# Patient Record
Sex: MALE | Race: WHITE | NOT HISPANIC OR LATINO | Employment: UNEMPLOYED | URBAN - METROPOLITAN AREA
[De-identification: names, ages, dates, MRNs, and addresses within clinical notes are randomized per-mention and may not be internally consistent; named-entity substitution may affect disease eponyms.]

---

## 2019-10-15 ENCOUNTER — OFFICE VISIT (OUTPATIENT)
Dept: FAMILY MEDICINE CLINIC | Facility: CLINIC | Age: 39
End: 2019-10-15

## 2019-10-15 VITALS
WEIGHT: 180.4 LBS | OXYGEN SATURATION: 98 % | TEMPERATURE: 98.4 F | SYSTOLIC BLOOD PRESSURE: 111 MMHG | HEIGHT: 71 IN | DIASTOLIC BLOOD PRESSURE: 62 MMHG | BODY MASS INDEX: 25.26 KG/M2 | HEART RATE: 64 BPM

## 2019-10-15 DIAGNOSIS — E78.5 HYPERLIPIDEMIA, UNSPECIFIED HYPERLIPIDEMIA TYPE: ICD-10-CM

## 2019-10-15 DIAGNOSIS — G89.4 PAIN SYNDROME, CHRONIC: ICD-10-CM

## 2019-10-15 DIAGNOSIS — Z23 NEED FOR IMMUNIZATION AGAINST INFLUENZA: ICD-10-CM

## 2019-10-15 DIAGNOSIS — K21.9 GASTROESOPHAGEAL REFLUX DISEASE WITHOUT ESOPHAGITIS: ICD-10-CM

## 2019-10-15 DIAGNOSIS — E03.9 HYPOTHYROIDISM, UNSPECIFIED TYPE: Primary | ICD-10-CM

## 2019-10-15 DIAGNOSIS — M54.10 RADICULOPATHY AFFECTING UPPER EXTREMITY: ICD-10-CM

## 2019-10-15 PROCEDURE — 90471 IMMUNIZATION ADMIN: CPT | Performed by: INTERNAL MEDICINE

## 2019-10-15 PROCEDURE — 99214 OFFICE O/P EST MOD 30 MIN: CPT | Performed by: INTERNAL MEDICINE

## 2019-10-15 PROCEDURE — 90686 IIV4 VACC NO PRSV 0.5 ML IM: CPT | Performed by: INTERNAL MEDICINE

## 2019-10-15 RX ORDER — TRIAMCINOLONE ACETONIDE 55 UG/1
2 SPRAY, METERED NASAL DAILY
COMMUNITY

## 2019-10-15 RX ORDER — PRAVASTATIN SODIUM 40 MG
40 TABLET ORAL
Refills: 5 | COMMUNITY
Start: 2019-09-21 | End: 2019-10-28

## 2019-10-15 RX ORDER — TRAMADOL HYDROCHLORIDE 50 MG/1
50 TABLET ORAL EVERY 6 HOURS PRN
Qty: 30 TABLET | Refills: 0 | Status: SHIPPED | OUTPATIENT
Start: 2019-10-15 | End: 2020-04-08 | Stop reason: SDUPTHER

## 2019-10-15 RX ORDER — MOMETASONE FUROATE 1 MG/G
CREAM TOPICAL DAILY
COMMUNITY

## 2019-10-15 RX ORDER — LEVOTHYROXINE SODIUM 137 UG/1
137 TABLET ORAL DAILY
Refills: 6 | COMMUNITY
Start: 2019-09-21 | End: 2019-10-28

## 2019-10-15 RX ORDER — OMEPRAZOLE 40 MG/1
20 CAPSULE, DELAYED RELEASE ORAL DAILY
COMMUNITY

## 2019-10-15 NOTE — PROGRESS NOTES
"Francis Nayak is a 39 y.o. male.     Chief Complaint   Patient presents with   • GI Problem   • Pain   • Back Pain   • other       History of Present Illness   Follow-up for thyroid.  Denies fatigue, weakness, constipation/diarrhea, hair/skin changes, weight gain/loss, depression/anxiety, rashes, palpitations, swelling, chest pain, shortness of breath or other issues.  Has been compliant with taking the medication with no recent changes.  Denies any difficulty with the current medication.  Is due for labs.  Follow-up for cholesterol.  Currently has been feeling well without any myalgias, muscle aches, weakness, numbness, chest pain, short of breath or other issues.  Currently is adherent with medication regimen and denies medication side effects. Is due for lab follow-up.    The last few weeks with nausea but has improved with different diet and consistent prilosec usage.  States the right arm and shoulder with neck discomfort and tired feeling for the last \"few months\"  Not really painful for fatigued.  Worsens as the day progresses.  Strength and sensation/feeling are \"fine\".  Has a \"vibrating feeling sometimes on the inside of the right biceps\"  Mainly at night when goes to bed.  Always in the same spot.  No pain.  Has been having some increased cramps in the feet and calves.  Has constant low back pain.  Continues with the chronic burning pain in the left side and chest worse in the mornings.  Uses the tramadol as needed which he does once a week but it causes him not to be hungry.      The following portions of the patient's history were reviewed and updated as appropriate: allergies, current medications, past family history, past medical history, past social history, past surgical history and problem list.    Past Medical History:   Diagnosis Date   • Alopecia    • BMI 25.0-25.9,adult    • Elevated ferritin    • Encounter for immunization    • Esophageal reflux    • History of long-term treatment " with high-risk medication    • Hives    • Hyperlipidemia    • Hypothyroidism    • Left-sided chest wall pain    • Pain syndrome, chronic    • Radiation retinopathy    • Rosacea    • Scoliosis    • Seasonal allergies    • Sensorineural hearing loss (SNHL), bilateral    • Vision loss, left eye        Past Surgical History:   Procedure Laterality Date   • CATARACT EXTRACTION  2004   • EYE SURGERY  1994    results Strabismus   • SINUS SURGERY  1990   • TOTAL THYROIDECTOMY  10/02/2013    benign folicular neoplasms       Family History   Problem Relation Age of Onset   • Lymphoma Father    • Lymphoma Maternal Grandmother    • Heart disease Maternal Grandmother         acute MI   • Coronary artery disease Maternal Grandmother        Social History     Socioeconomic History   • Marital status: Single     Spouse name: Not on file   • Number of children: Not on file   • Years of education: Not on file   • Highest education level: Not on file   Tobacco Use   • Smoking status: Never Smoker   • Smokeless tobacco: Never Used   Substance and Sexual Activity   • Alcohol use: Yes     Frequency: 2-3 times a week     Drinks per session: 5 or 6     Binge frequency: Monthly   • Drug use: No   • Sexual activity: Defer       Review of Systems   Constitutional: Negative for activity change, appetite change, fatigue, fever, unexpected weight gain and unexpected weight loss.   HENT: Negative for nosebleeds, rhinorrhea, trouble swallowing and voice change.    Eyes: Negative for visual disturbance.   Respiratory: Negative for cough, chest tightness, shortness of breath and wheezing.    Cardiovascular: Negative for chest pain, palpitations and leg swelling.   Gastrointestinal: Negative for abdominal pain, blood in stool, constipation, diarrhea, nausea, vomiting, GERD and indigestion.   Genitourinary: Negative for dysuria, frequency and hematuria.   Musculoskeletal: Negative for arthralgias, back pain and myalgias.   Skin: Negative for rash and  bruise.   Neurological: Negative for dizziness, tremors, weakness, light-headedness, numbness, headache and memory problem.   Hematological: Negative for adenopathy. Does not bruise/bleed easily.   Psychiatric/Behavioral: Negative for sleep disturbance and depressed mood. The patient is not nervous/anxious.        Objective   Vitals:    10/15/19 1121   BP: 111/62   Pulse: 64   Temp: 98.4 °F (36.9 °C)   SpO2: 98%     Body mass index is 25.16 kg/m².  Physical Exam   Constitutional: He is oriented to person, place, and time. He appears well-developed and well-nourished. No distress.   HENT:   Head: Normocephalic and atraumatic.   Right Ear: External ear normal.   Left Ear: External ear normal.   Nose: Nose normal.   Mouth/Throat: Oropharynx is clear and moist.   Eyes: Conjunctivae and EOM are normal. Pupils are equal, round, and reactive to light.   Neck: Normal range of motion. Neck supple. No tracheal deviation present. No thyromegaly present.   Cardiovascular: Normal rate, regular rhythm, normal heart sounds and intact distal pulses. Exam reveals no gallop and no friction rub.   No murmur heard.  Pulmonary/Chest: Effort normal and breath sounds normal. No respiratory distress.   Abdominal: Soft. Bowel sounds are normal. He exhibits no mass. There is no tenderness. There is no guarding.   Musculoskeletal: Normal range of motion. He exhibits no edema.   Lymphadenopathy:     He has no cervical adenopathy.   Neurological: He is alert and oriented to person, place, and time. He displays normal reflexes. He exhibits normal muscle tone.   Skin: Skin is warm and dry. Capillary refill takes less than 2 seconds. No rash noted. He is not diaphoretic.   Psychiatric: He has a normal mood and affect. His behavior is normal. Judgment and thought content normal.   Nursing note and vitals reviewed.      No results found for this or any previous visit (from the past 2016 hour(s)).  Assessment/Plan   Sami was seen today for gi  problem, pain, back pain and other.    Diagnoses and all orders for this visit:    Hypothyroidism, unspecified type  -     TSH  -     T4, Free  -     Vitamin B12 & Folate    Hyperlipidemia, unspecified hyperlipidemia type  -     Comprehensive Metabolic Panel  -     Lipid Panel    Gastroesophageal reflux disease without esophagitis    Radiculopathy affecting upper extremity  -     traMADol (ULTRAM) 50 MG tablet; Take 1 tablet by mouth Every 6 (Six) Hours As Needed for Moderate Pain .    Pain syndrome, chronic  -     traMADol (ULTRAM) 50 MG tablet; Take 1 tablet by mouth Every 6 (Six) Hours As Needed for Moderate Pain .    Need for immunization against influenza  -     Fluarix/Fluzone/Afluria Quad>6 Months; Standing  -     Fluarix/Fluzone/Afluria Quad>6 Months      Will check the labs as ordered.  Discussed and reviewed the patient's multiple vague symptoms.  We will check the labs as noted above.  Avoid alcohol tobacco if all normal and still issues with the arm then consider MRI of the neck for evaluation.  Encouraged diet modification for the stomach with the GERD issues.  And acidic foods including tomato sauces and citrus drinks.  Continue the omeprazole for the stomach.  WIN run and reviewed.  Risks of the medication include but are not limited to fatigue, somnolence, increased risk of falls, constipation, allergic reaction, dependence, and addiction

## 2019-10-16 DIAGNOSIS — E03.9 HYPOTHYROIDISM, UNSPECIFIED TYPE: Primary | ICD-10-CM

## 2019-10-16 LAB
ALBUMIN SERPL-MCNC: 5.2 G/DL (ref 3.5–5.2)
ALBUMIN/GLOB SERPL: 1.9 G/DL
ALP SERPL-CCNC: 72 U/L (ref 39–117)
ALT SERPL-CCNC: 21 U/L (ref 1–41)
AST SERPL-CCNC: 18 U/L (ref 1–40)
BILIRUB SERPL-MCNC: 0.5 MG/DL (ref 0.2–1.2)
BUN SERPL-MCNC: 11 MG/DL (ref 6–20)
BUN/CREAT SERPL: 9.7 (ref 7–25)
CALCIUM SERPL-MCNC: 9.8 MG/DL (ref 8.6–10.5)
CHLORIDE SERPL-SCNC: 99 MMOL/L (ref 98–107)
CHOLEST SERPL-MCNC: 189 MG/DL (ref 0–200)
CO2 SERPL-SCNC: 35.6 MMOL/L (ref 22–29)
CREAT SERPL-MCNC: 1.13 MG/DL (ref 0.76–1.27)
FOLATE SERPL-MCNC: 4.81 NG/ML (ref 4.78–24.2)
GLOBULIN SER CALC-MCNC: 2.8 GM/DL
GLUCOSE SERPL-MCNC: 81 MG/DL (ref 65–99)
HDLC SERPL-MCNC: 49 MG/DL (ref 40–60)
LDLC SERPL CALC-MCNC: 114 MG/DL (ref 0–100)
POTASSIUM SERPL-SCNC: 4.8 MMOL/L (ref 3.5–5.2)
PROT SERPL-MCNC: 8 G/DL (ref 6–8.5)
SODIUM SERPL-SCNC: 142 MMOL/L (ref 136–145)
T4 FREE SERPL-MCNC: 1.33 NG/DL (ref 0.93–1.7)
TRIGL SERPL-MCNC: 132 MG/DL (ref 0–150)
TSH SERPL DL<=0.005 MIU/L-ACNC: 23.6 UIU/ML (ref 0.27–4.2)
VIT B12 SERPL-MCNC: 333 PG/ML (ref 211–946)
VLDLC SERPL CALC-MCNC: 26.4 MG/DL (ref 5–40)

## 2019-10-17 ENCOUNTER — TELEPHONE (OUTPATIENT)
Dept: FAMILY MEDICINE CLINIC | Facility: CLINIC | Age: 39
End: 2019-10-17

## 2019-10-17 DIAGNOSIS — R79.89 ELEVATED TSH: Primary | ICD-10-CM

## 2019-10-17 DIAGNOSIS — Z85.841 HISTORY OF MEDULLOBLASTOMA: ICD-10-CM

## 2019-10-17 NOTE — TELEPHONE ENCOUNTER
The MRI has been ordered of the head we are just waiting for that to be scheduled.  Depending upon the result will determine what needs to be done and it what timeframe.  No new medicines recommended at this time.

## 2019-10-17 NOTE — TELEPHONE ENCOUNTER
Pt mtr called that you return her call. She is concerned because endocrinology was not able to get pt in until 12/10/19. They want to know if it is okay to wait that long, what can be done about the pain and also can you go ahead and order an MRI?

## 2019-10-21 ENCOUNTER — TELEPHONE (OUTPATIENT)
Dept: ENDOCRINOLOGY | Age: 39
End: 2019-10-21

## 2019-10-21 NOTE — TELEPHONE ENCOUNTER
There is a new patient slot on Friday. Yes it will give him 15 but it sounds like the patient needs to be seen so use it if they can come in.

## 2019-10-21 NOTE — TELEPHONE ENCOUNTER
I called mother but the new patient  appointment on 10-25-19 has been used for another patient. I told her I would let you know and that she can keep calling our office to see if Dr. Roy has a cancellation. If you have an earlier date and time than scheduled,  will you let me know. Thank you.

## 2019-10-21 NOTE — TELEPHONE ENCOUNTER
Mother Marion called regards to patient being on wait list for sooner appointment with Dr. Roy on 12-10-19., 9:00am.  She said patient had brain and spine cancer when he was 14 and 6 years ago had thyroid cancer surgery.  She said he has long term effects from radiation.  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 He had cataract surgery on the left eye and has scar tissue from radiation  and has a cataract on the other eye. 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 Elkins thinks patient has pituitary turmor based on bloodwork and said patient needs an MRI and said Dr. Elkins wants Dr. Roy to order an MRI.    She said patient has new pain in right side from neck all the way down to toes  and arms feel tired feeling weight pressing down on arms.   She said left side effected  from brain cancer  and does not use arm much and has coordination problems on left side      At this time there is not another new patient slot available that I can schedule.  Does Dr. Roy have a date sooner that he would want to see patient.   Mother said she has been calling every day to see if he has sooner appt.   Mother - [phone]

## 2019-10-22 ENCOUNTER — TELEPHONE (OUTPATIENT)
Dept: FAMILY MEDICINE CLINIC | Facility: CLINIC | Age: 39
End: 2019-10-22

## 2019-10-23 ENCOUNTER — OFFICE VISIT (OUTPATIENT)
Dept: ENDOCRINOLOGY | Age: 39
End: 2019-10-23

## 2019-10-23 VITALS
WEIGHT: 182 LBS | SYSTOLIC BLOOD PRESSURE: 108 MMHG | DIASTOLIC BLOOD PRESSURE: 52 MMHG | HEIGHT: 71 IN | BODY MASS INDEX: 25.48 KG/M2

## 2019-10-23 DIAGNOSIS — R20.2 PARESTHESIA: ICD-10-CM

## 2019-10-23 DIAGNOSIS — Z85.841 HISTORY OF MEDULLOBLASTOMA: ICD-10-CM

## 2019-10-23 DIAGNOSIS — R79.89 ELEVATED TSH: ICD-10-CM

## 2019-10-23 DIAGNOSIS — R53.82 CHRONIC FATIGUE: ICD-10-CM

## 2019-10-23 DIAGNOSIS — M79.10 MYALGIA: ICD-10-CM

## 2019-10-23 DIAGNOSIS — E04.2 MULTINODULAR THYROID: ICD-10-CM

## 2019-10-23 DIAGNOSIS — E06.3 HYPOTHYROIDISM DUE TO HASHIMOTO'S THYROIDITIS: Primary | ICD-10-CM

## 2019-10-23 DIAGNOSIS — E78.5 HYPERLIPIDEMIA, UNSPECIFIED HYPERLIPIDEMIA TYPE: ICD-10-CM

## 2019-10-23 DIAGNOSIS — R53.1 WEAKNESS: ICD-10-CM

## 2019-10-23 DIAGNOSIS — E03.8 HYPOTHYROIDISM DUE TO HASHIMOTO'S THYROIDITIS: Primary | ICD-10-CM

## 2019-10-23 PROCEDURE — 99204 OFFICE O/P NEW MOD 45 MIN: CPT | Performed by: INTERNAL MEDICINE

## 2019-10-23 NOTE — PROGRESS NOTES
"Francis Nayak is a 39 y.o. male Seen as a NP for Elevated TSH. Patient complains of numbness, tingles, pain on right side and Pituitary Adenoma Patient had Ultra sound  Imaging.    /52   Ht 180.3 cm (71\")   Wt 82.6 kg (182 lb)   BMI 25.38 kg/m²     Allergies   Allergen Reactions   • Penicillins Anaphylaxis     [facility] Allergy Text Annotation: penicillins   • Latex Hives         Current Outpatient Medications:   •  levothyroxine (SYNTHROID, LEVOTHROID) 137 MCG tablet, Take 137 mcg by mouth Daily., Disp: , Rfl: 6  •  mometasone (ELOCON) 0.1 % cream, Apply  topically to the appropriate area as directed Daily., Disp: , Rfl:   •  omeprazole (priLOSEC) 40 MG capsule, Take 20 mg by mouth Daily., Disp: , Rfl:   •  pravastatin (PRAVACHOL) 40 MG tablet, Take 40 mg by mouth every night at bedtime., Disp: , Rfl: 5  •  traMADol (ULTRAM) 50 MG tablet, Take 1 tablet by mouth Every 6 (Six) Hours As Needed for Moderate Pain ., Disp: 30 tablet, Rfl: 0  •  Triamcinolone Acetonide (NASACORT ALLERGY 24HR) 55 MCG/ACT nasal inhaler, 2 sprays into the nostril(s) as directed by provider Daily., Disp: , Rfl:         History of Present Illness this is a 39-year-old gentleman who has been referred for further evaluation and treatment of recently diagnosed elevated level of TSH.  He is here today with his parents who provided most of the information.  As a 14-year-old that he was diagnosed as having medulloblastoma of his brain for which he underwent craniotomy and removal of the tumor.  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  Because of multiple medical problems he is unable to work and for all intents and purposes is disabled.  He is having problem with pain and numbness and tingling and paresthesia on the right side of his neck shoulder and upper arm.  He also has pinpoint vision in his left eye and cataract in the " right eye and therefore even though he has a 's license but he does not drive.  For his glioblastoma in addition to surgery he had extensive and prolonged radiation he is single and has never  nor has he any children.  Because of radiation he has lost all his scalp hair and is wearing a wig.  He has not had any MRI of his brain for several years.  Also a copy of summary of history and the list of medications which was repaired by the parents is attached.    The following portions of the patient's history were reviewed and updated as appropriate: allergies, current medications, past family history, past medical history, past social history, past surgical history and problem list.    Review of Systems   Constitutional: Positive for fatigue.   HENT: Negative.    Eyes: Negative.    Respiratory: Negative.    Cardiovascular: Negative.    Gastrointestinal: Negative.    Endocrine: Negative.    Genitourinary: Negative.    Musculoskeletal: Positive for back pain and myalgias.   Skin: Negative.    Allergic/Immunologic: Negative.    Neurological: Positive for weakness and numbness.   Hematological: Negative.    Psychiatric/Behavioral: Negative.    The above review of system was reviewed, corroborated and accepted.  Objective   Physical Exam   Constitutional: He is oriented to person, place, and time. He appears well-developed and well-nourished. No distress.   HENT:   Head: Normocephalic and atraumatic.   Right Ear: External ear normal.   Left Ear: External ear normal.   Nose: Nose normal.   Mouth/Throat: Oropharynx is clear and moist. No oropharyngeal exudate.   Asymmetry between both eyes and the left eye seems to be lazy and not as active in following objects.  He also is hard of hearing to some degree.   Eyes: Conjunctivae and EOM are normal. Pupils are equal, round, and reactive to light. Right eye exhibits no discharge. Left eye exhibits no discharge. No scleral icterus.   Neck: Normal range of motion. Neck  supple. No JVD present. No tracheal deviation present. No thyromegaly present.   Healed the scar of total thyroidectomy in lower neck.  There are no lymphadenopathies in the anterior or posterior cervical as well as supraclavicular area.   Cardiovascular: Normal rate, regular rhythm, normal heart sounds and intact distal pulses. Exam reveals no gallop and no friction rub.   No murmur heard.  Pulmonary/Chest: Effort normal and breath sounds normal. No stridor. No respiratory distress. He has no wheezes. He has no rales. He exhibits no tenderness.   Abdominal: Soft. Bowel sounds are normal. He exhibits no distension and no mass. There is no tenderness. There is no rebound and no guarding. No hernia.   Musculoskeletal: Normal range of motion. He exhibits no edema, tenderness or deformity.   Lymphadenopathy:     He has no cervical adenopathy.   Neurological: He is alert and oriented to person, place, and time. He displays normal reflexes. No cranial nerve deficit or sensory deficit. He exhibits normal muscle tone. Coordination normal.   Skin: Skin is warm and dry. No rash noted. He is not diaphoretic. No erythema. No pallor.   Totally bald wearing a hair piece.   Psychiatric: He has a normal mood and affect. His behavior is normal. Judgment and thought content normal.   Nursing note and vitals reviewed.        Assessment/Plan   Diagnoses and all orders for this visit:    Hypothyroidism due to Hashimoto's thyroiditis  -     T3, Free  -     T4 & TSH ([facility])  -     T4, Free  -     TestT+TestF+SHBG  -     Uric Acid  -     Vitamin D 25 Hydroxy  -     Comprehensive Metabolic Panel  -     Follicle Stimulating Hormone  -     NMR LipoProfile  -     Prolactin  -     PSA DIAGNOSTIC  -     ACTH  -     Cortisol  -     Calcium, Ionized  -     PTH, Intact  -     Growth Hormone  -     Insulin-like Growth Factor  -     Osmolality, Serum  -     Comprehensive Thyroglobulin  -     Calcitonin    Multinodular thyroid  -     T3, Free  -      T4 & TSH ([facility])  -     T4, Free  -     TestT+TestF+SHBG  -     Uric Acid  -     Vitamin D 25 Hydroxy  -     Comprehensive Metabolic Panel  -     Follicle Stimulating Hormone  -     NMR LipoProfile  -     Prolactin  -     PSA DIAGNOSTIC  -     ACTH  -     Cortisol  -     Calcium, Ionized  -     PTH, Intact  -     Growth Hormone  -     Insulin-like Growth Factor  -     Osmolality, Serum  -     Comprehensive Thyroglobulin  -     Calcitonin    Hyperlipidemia, unspecified hyperlipidemia type  -     T3, Free  -     T4 & TSH ([facility])  -     T4, Free  -     TestT+TestF+SHBG  -     Uric Acid  -     Vitamin D 25 Hydroxy  -     Comprehensive Metabolic Panel  -     Follicle Stimulating Hormone  -     NMR LipoProfile  -     Prolactin  -     PSA DIAGNOSTIC  -     ACTH  -     Cortisol  -     Calcium, Ionized  -     PTH, Intact  -     Growth Hormone  -     Insulin-like Growth Factor  -     Osmolality, Serum  -     Comprehensive Thyroglobulin  -     Calcitonin    Elevated TSH  -     T3, Free  -     T4 & TSH ([facility])  -     T4, Free  -     TestT+TestF+SHBG  -     Uric Acid  -     Vitamin D 25 Hydroxy  -     Comprehensive Metabolic Panel  -     Follicle Stimulating Hormone  -     NMR LipoProfile  -     Prolactin  -     PSA DIAGNOSTIC  -     ACTH  -     Cortisol  -     Calcium, Ionized  -     PTH, Intact  -     Growth Hormone  -     Insulin-like Growth Factor  -     Osmolality, Serum  -     Comprehensive Thyroglobulin  -     Calcitonin    Chronic fatigue  -     T3, Free  -     T4 & TSH ([facility])  -     T4, Free  -     TestT+TestF+SHBG  -     Uric Acid  -     Vitamin D 25 Hydroxy  -     Comprehensive Metabolic Panel  -     Follicle Stimulating Hormone  -     NMR LipoProfile  -     Prolactin  -     PSA DIAGNOSTIC  -     ACTH  -     Cortisol  -     Calcium, Ionized  -     PTH, Intact  -     Growth Hormone  -     Insulin-like Growth Factor  -     Osmolality, Serum  -     Comprehensive Thyroglobulin  -     Calcitonin    Weakness  -      T3, Free  -     T4 & TSH ([facility])  -     T4, Free  -     TestT+TestF+SHBG  -     Uric Acid  -     Vitamin D 25 Hydroxy  -     Comprehensive Metabolic Panel  -     Follicle Stimulating Hormone  -     NMR LipoProfile  -     Prolactin  -     PSA DIAGNOSTIC  -     ACTH  -     Cortisol  -     Calcium, Ionized  -     PTH, Intact  -     Growth Hormone  -     Insulin-like Growth Factor  -     Osmolality, Serum  -     MRI cervical spine w wo contrast; Future  -     Comprehensive Thyroglobulin  -     Calcitonin    Myalgia  -     T3, Free  -     T4 & TSH ([facility])  -     T4, Free  -     TestT+TestF+SHBG  -     Uric Acid  -     Vitamin D 25 Hydroxy  -     Comprehensive Metabolic Panel  -     Follicle Stimulating Hormone  -     NMR LipoProfile  -     Prolactin  -     PSA DIAGNOSTIC  -     ACTH  -     Cortisol  -     Calcium, Ionized  -     PTH, Intact  -     Growth Hormone  -     Insulin-like Growth Factor  -     Osmolality, Serum  -     MRI cervical spine w wo contrast; Future  -     Comprehensive Thyroglobulin  -     Calcitonin    Paresthesia  -     T3, Free  -     T4 & TSH ([facility])  -     T4, Free  -     TestT+TestF+SHBG  -     Uric Acid  -     Vitamin D 25 Hydroxy  -     Comprehensive Metabolic Panel  -     Follicle Stimulating Hormone  -     NMR LipoProfile  -     Prolactin  -     PSA DIAGNOSTIC  -     ACTH  -     Cortisol  -     Calcium, Ionized  -     PTH, Intact  -     Growth Hormone  -     Insulin-like Growth Factor  -     Osmolality, Serum  -     MRI cervical spine w wo contrast; Future  -     Comprehensive Thyroglobulin  -     Calcitonin    History of medulloblastoma  -     MRI Brain With & Without Contrast; Future  -     Comprehensive Thyroglobulin  -     Calcitonin      Is summary I saw and examined this 39-year-old gentleman for above-mentioned problems.  I reviewed his laboratory evaluations of 10/15/2019 and the at this point we will go ahead and order extensive laboratory evaluation as well as getting an MRI  of his brain as well as C-spine and once the results of all of these come back we will go ahead and call for any possible modification, new evaluation or recommendations.  He will see Ms. Kristel Barros in 4 months or sooner if needed with laboratory evaluation prior to each office visit.

## 2019-10-26 LAB
25(OH)D3+25(OH)D2 SERPL-MCNC: 9.1 NG/ML (ref 30–100)
ACTH PLAS-MCNC: 24.4 PG/ML (ref 7.2–63.3)
ALBUMIN SERPL-MCNC: 5 G/DL (ref 3.5–5.2)
ALBUMIN/GLOB SERPL: 1.6 G/DL
ALP SERPL-CCNC: 79 U/L (ref 39–117)
ALT SERPL-CCNC: 26 U/L (ref 1–41)
AST SERPL-CCNC: 23 U/L (ref 1–40)
BILIRUB SERPL-MCNC: 0.3 MG/DL (ref 0.2–1.2)
BUN SERPL-MCNC: 18 MG/DL (ref 6–20)
BUN/CREAT SERPL: 16.8 (ref 7–25)
CA-I SERPL ISE-MCNC: 5.2 MG/DL (ref 4.5–5.6)
CALCIT SERPL-MCNC: <2 PG/ML (ref 0–8.4)
CALCIUM SERPL-MCNC: 10 MG/DL (ref 8.6–10.5)
CHLORIDE SERPL-SCNC: 100 MMOL/L (ref 98–107)
CHOLEST SERPL-MCNC: 215 MG/DL (ref 100–199)
CO2 SERPL-SCNC: 30.6 MMOL/L (ref 22–29)
CORTIS SERPL-MCNC: 10.5 UG/DL
CREAT SERPL-MCNC: 1.07 MG/DL (ref 0.76–1.27)
FSH SERPL-ACNC: 21.5 MIU/ML (ref 1.5–12.4)
GH SERPL-MCNC: 0.1 NG/ML (ref 0–10)
GLOBULIN SER CALC-MCNC: 3.1 GM/DL
GLUCOSE SERPL-MCNC: 97 MG/DL (ref 65–99)
HDL SERPL-SCNC: 38.7 UMOL/L
HDLC SERPL-MCNC: 56 MG/DL
IGF-I SERPL-MCNC: 157 NG/ML (ref 75–216)
LDL SERPL QN: 21.2 NM
LDL SERPL-SCNC: 1543 NMOL/L
LDL SMALL SERPL-SCNC: 605 NMOL/L
LDLC SERPL CALC-MCNC: 126 MG/DL (ref 0–99)
OSMOLALITY SERPL: 300 MOSMOL/KG (ref 275–295)
POTASSIUM SERPL-SCNC: 4.3 MMOL/L (ref 3.5–5.2)
PROLACTIN SERPL-MCNC: 13.3 NG/ML (ref 4–15.2)
PROT SERPL-MCNC: 8.1 G/DL (ref 6–8.5)
PSA SERPL-MCNC: 1.24 NG/ML (ref 0–4)
PTH-INTACT SERPL-MCNC: 54 PG/ML (ref 15–65)
SHBG SERPL-SCNC: 28.6 NMOL/L (ref 16.5–55.9)
SODIUM SERPL-SCNC: 145 MMOL/L (ref 136–145)
T3FREE SERPL-MCNC: 2.2 PG/ML (ref 2–4.4)
T4 FREE SERPL-MCNC: 1.27 NG/DL (ref 0.93–1.7)
T4 SERPL-MCNC: 7.97 MCG/DL (ref 4.5–11.7)
TESTOST FREE SERPL-MCNC: 4.9 PG/ML (ref 6.8–21.5)
TESTOST SERPL-MCNC: 314 NG/DL (ref 264–916)
THYROGLOB AB SERPL-ACNC: <1 IU/ML
THYROGLOB SERPL-MCNC: 1.1 NG/ML
THYROGLOB SERPL-MCNC: NORMAL NG/ML
TRIGL SERPL-MCNC: 165 MG/DL (ref 0–149)
TSH SERPL DL<=0.005 MIU/L-ACNC: 31.3 UIU/ML (ref 0.27–4.2)
URATE SERPL-MCNC: 5.9 MG/DL (ref 3.4–7)

## 2019-10-28 RX ORDER — ERGOCALCIFEROL 1.25 MG/1
50000 CAPSULE ORAL 3 TIMES WEEKLY
Qty: 39 CAPSULE | Refills: 3 | Status: SHIPPED | OUTPATIENT
Start: 2019-10-28 | End: 2020-04-08 | Stop reason: SDUPTHER

## 2019-10-28 RX ORDER — PRAVASTATIN SODIUM 80 MG/1
80 TABLET ORAL EVERY EVENING
Qty: 90 TABLET | Refills: 3 | Status: SHIPPED | OUTPATIENT
Start: 2019-10-28 | End: 2020-10-27

## 2019-10-28 RX ORDER — TESTOSTERONE 16.2 MG/G
GEL TRANSDERMAL
Qty: 150 G | Refills: 5 | Status: SHIPPED | OUTPATIENT
Start: 2019-10-28 | End: 2019-11-05 | Stop reason: SDUPTHER

## 2019-10-28 RX ORDER — LEVOTHYROXINE SODIUM 200 MCG
200 TABLET ORAL DAILY
Qty: 30 TABLET | Refills: 11 | Status: SHIPPED | OUTPATIENT
Start: 2019-10-28 | End: 2020-03-16 | Stop reason: SDUPTHER

## 2019-10-28 RX ORDER — ROSUVASTATIN CALCIUM 20 MG/1
20 TABLET, COATED ORAL NIGHTLY
Qty: 90 TABLET | Refills: 3 | Status: SHIPPED | OUTPATIENT
Start: 2019-10-28 | End: 2019-10-28

## 2019-10-30 ENCOUNTER — APPOINTMENT (OUTPATIENT)
Dept: MRI IMAGING | Facility: HOSPITAL | Age: 39
End: 2019-10-30

## 2019-10-31 ENCOUNTER — HOSPITAL ENCOUNTER (OUTPATIENT)
Dept: MRI IMAGING | Facility: HOSPITAL | Age: 39
Discharge: HOME | End: 2019-10-31
Admitting: INTERNAL MEDICINE

## 2019-10-31 ENCOUNTER — HOSPITAL ENCOUNTER (OUTPATIENT)
Dept: MRI IMAGING | Facility: HOSPITAL | Age: 39
Discharge: HOME | End: 2019-10-31

## 2019-10-31 DIAGNOSIS — R20.2 PARESTHESIA: ICD-10-CM

## 2019-10-31 DIAGNOSIS — M79.10 MYALGIA: ICD-10-CM

## 2019-10-31 DIAGNOSIS — Z85.841 HISTORY OF MEDULLOBLASTOMA: ICD-10-CM

## 2019-10-31 DIAGNOSIS — R53.1 WEAKNESS: ICD-10-CM

## 2019-10-31 PROCEDURE — 0 GADOBENATE DIMEGLUMINE 529 MG/ML SOLUTION: Performed by: INTERNAL MEDICINE

## 2019-10-31 PROCEDURE — A9577 INJ MULTIHANCE: HCPCS | Performed by: INTERNAL MEDICINE

## 2019-10-31 PROCEDURE — 70553 MRI BRAIN STEM W/O & W/DYE: CPT

## 2019-10-31 PROCEDURE — 72156 MRI NECK SPINE W/O & W/DYE: CPT

## 2019-10-31 RX ADMIN — GADOBENATE DIMEGLUMINE 17 ML: 529 INJECTION, SOLUTION INTRAVENOUS at 13:55

## 2019-11-04 ENCOUNTER — HOSPITAL ENCOUNTER (OUTPATIENT)
Dept: MRI IMAGING | Facility: HOSPITAL | Age: 39
End: 2019-11-04

## 2019-11-04 ENCOUNTER — APPOINTMENT (OUTPATIENT)
Dept: MRI IMAGING | Facility: HOSPITAL | Age: 39
End: 2019-11-04

## 2019-11-05 RX ORDER — TESTOSTERONE 16.2 MG/G
GEL TRANSDERMAL
Qty: 150 G | Refills: 5 | Status: SHIPPED | OUTPATIENT
Start: 2019-11-05 | End: 2019-11-06

## 2019-11-06 RX ORDER — TESTOSTERONE GEL, 1% 10 MG/G
GEL TRANSDERMAL
Qty: 60 TUBE | Refills: 5 | Status: SHIPPED | OUTPATIENT
Start: 2019-11-06 | End: 2019-11-12

## 2019-11-11 DIAGNOSIS — M54.10 RADICULOPATHY AFFECTING UPPER EXTREMITY: Primary | ICD-10-CM

## 2019-11-11 DIAGNOSIS — G89.4 PAIN SYNDROME, CHRONIC: ICD-10-CM

## 2019-11-12 RX ORDER — TESTOSTERONE GEL, 1% 10 MG/G
GEL TRANSDERMAL
Qty: 60 TUBE | Refills: 5 | Status: SHIPPED | OUTPATIENT
Start: 2019-11-12 | End: 2019-11-15

## 2019-11-18 RX ORDER — TESTOSTERONE 12.5 MG/1.25G
50 GEL TOPICAL DAILY
Qty: 60 EACH | Refills: 5 | Status: SHIPPED | OUTPATIENT
Start: 2019-11-18 | End: 2020-04-08 | Stop reason: SDUPTHER

## 2019-12-04 ENCOUNTER — PRIOR AUTHORIZATION (OUTPATIENT)
Dept: ENDOCRINOLOGY | Age: 39
End: 2019-12-04

## 2020-03-16 RX ORDER — LEVOTHYROXINE SODIUM 0.2 MG/1
200 TABLET ORAL DAILY
Qty: 30 TABLET | Refills: 11 | Status: SHIPPED | OUTPATIENT
Start: 2020-03-16 | End: 2020-04-08 | Stop reason: SDUPTHER

## 2020-04-08 ENCOUNTER — TELEMEDICINE (OUTPATIENT)
Dept: FAMILY MEDICINE CLINIC | Facility: CLINIC | Age: 40
End: 2020-04-08

## 2020-04-08 DIAGNOSIS — M54.10 RADICULOPATHY AFFECTING UPPER EXTREMITY: ICD-10-CM

## 2020-04-08 DIAGNOSIS — M79.10 MYALGIA: Primary | ICD-10-CM

## 2020-04-08 DIAGNOSIS — G89.4 PAIN SYNDROME, CHRONIC: ICD-10-CM

## 2020-04-08 DIAGNOSIS — R79.89 ELEVATED FERRITIN: ICD-10-CM

## 2020-04-08 PROCEDURE — 99214 OFFICE O/P EST MOD 30 MIN: CPT | Performed by: INTERNAL MEDICINE

## 2020-04-08 RX ORDER — LEVOTHYROXINE SODIUM 175 UG/1
175 TABLET ORAL DAILY
COMMUNITY
Start: 2020-03-24 | End: 2020-06-22

## 2020-04-08 RX ORDER — TRAMADOL HYDROCHLORIDE 50 MG/1
50 TABLET ORAL EVERY 6 HOURS PRN
Qty: 30 TABLET | Refills: 0 | Status: SHIPPED | OUTPATIENT
Start: 2020-04-08 | End: 2020-07-27 | Stop reason: SDUPTHER

## 2020-04-08 NOTE — PROGRESS NOTES
"Francis Nayak is a 39 y.o. male.     Chief Complaint   Patient presents with   • Pain   • Hypothyroidism       History of Present Illness   Video visit completed using [employer] video celi.    Continues with right arm and shoulder with neck discomfort and tired feeling for the last \"few months\"  Not really painful for fatigued.  Worsens as the day progresses.  Strength and sensation/feeling are \"fine\".  Has a \"vibrating feeling sometimes on the inside of the right biceps\"  Mainly at night when goes to bed.  Always in the same spot.  No pain.  Has been having some persistent cramps in the feet and calves.  Has constant low back pain.  Continues with the chronic burning pain in the left side and chest worse in the mornings.  Uses the tramadol as needed which he does once a week but it causes him not to be hungry.  If he squats for over 30 seconds then stands up feels his left knee hurt and week briefly then resolves.  Just wanted to let us know.    He is now seeing endocrinology Dr Bryant with [facility].  She has decreased the synthroid to 175 mcg, stopped the testosterone and cahnged the vitamin D to 5000 IU daily.  She is going to be taking over the pravastatin and has follow up scheduled for 5/6/2020 with labs.    The following portions of the patient's history were reviewed and updated as appropriate: allergies, current medications, past family history, past medical history, past social history, past surgical history and problem list.    Depression Screen:  No flowsheet data found.    Past Medical History:   Diagnosis Date   • Alopecia    • BMI 25.0-25.9,adult    • Elevated ferritin    • Encounter for immunization    • Esophageal reflux    • History of long-term treatment with high-risk medication    • Hives    • Hyperlipidemia    • Hypothyroidism    • Left-sided chest wall pain    • Pain syndrome, chronic    • Pituitary adenoma (CMS/HCC)    • Radiation retinopathy    • Rosacea    • Scoliosis    • " Seasonal allergies    • Sensorineural hearing loss (SNHL), bilateral    • Vision loss, left eye        Past Surgical History:   Procedure Laterality Date   • CATARACT EXTRACTION  2004   • EYE SURGERY  1994    results Strabismus   • SINUS SURGERY  1990   • THYROID SURGERY     • TOTAL THYROIDECTOMY  10/02/2013    benign folicular neoplasms       Family History   Problem Relation Age of Onset   • Lymphoma Father    • Lymphoma Maternal Grandmother    • Heart disease Maternal Grandmother         acute MI   • Coronary artery disease Maternal Grandmother        Social History     Socioeconomic History   • Marital status: Single     Spouse name: Not on file   • Number of children: Not on file   • Years of education: Not on file   • Highest education level: Not on file   Tobacco Use   • Smoking status: Never Smoker   • Smokeless tobacco: Never Used   Substance and Sexual Activity   • Alcohol use: Yes     Frequency: 2-3 times a week     Drinks per session: 5 or 6     Binge frequency: Monthly   • Drug use: No   • Sexual activity: Defer       Current Outpatient Medications   Medication Sig Dispense Refill   • Cholecalciferol (VITAMIN D3) 125 MCG (5000 UT) capsule capsule Take 5,000 Units by mouth Daily.     • levothyroxine (SYNTHROID, LEVOTHROID) 175 MCG tablet Take 175 mcg by mouth Daily.     • mometasone (ELOCON) 0.1 % cream Apply  topically to the appropriate area as directed Daily.     • omeprazole (priLOSEC) 40 MG capsule Take 20 mg by mouth Daily.     • pravastatin (PRAVACHOL) 80 MG tablet Take 1 tablet by mouth Every Evening. 90 tablet 3   • traMADol (ULTRAM) 50 MG tablet Take 1 tablet by mouth Every 6 (Six) Hours As Needed for Moderate Pain . 30 tablet 0   • Triamcinolone Acetonide (NASACORT ALLERGY 24HR) 55 MCG/ACT nasal inhaler 2 sprays into the nostril(s) as directed by provider Daily.       No current facility-administered medications for this visit.        Review of Systems   Constitutional: Negative for activity  change, appetite change, fatigue, fever, unexpected weight gain and unexpected weight loss.   HENT: Negative for nosebleeds, rhinorrhea, trouble swallowing and voice change.    Eyes: Negative for visual disturbance.   Respiratory: Negative for cough, chest tightness, shortness of breath and wheezing.    Cardiovascular: Negative for chest pain, palpitations and leg swelling.   Gastrointestinal: Negative for abdominal pain, blood in stool, constipation, diarrhea, nausea, vomiting, GERD and indigestion.   Genitourinary: Negative for dysuria, frequency and hematuria.   Musculoskeletal:        Vague intermittent pains and weakness as noted in HPI.   Skin: Negative for rash and bruise.   Neurological: Negative for dizziness, tremors, weakness, light-headedness, numbness, headache and memory problem.   Hematological: Negative for adenopathy. Does not bruise/bleed easily.   Psychiatric/Behavioral: Negative for sleep disturbance and depressed mood. The patient is not nervous/anxious.        Objective   There were no vitals taken for this visit.    Physical Exam   Constitutional: He is oriented to person, place, and time. He appears well-developed and well-nourished. No distress.   Pulmonary/Chest: Effort normal.   Neurological: He is alert and oriented to person, place, and time.   Skin: He is not diaphoretic.   Psychiatric: He has a normal mood and affect. His behavior is normal. Judgment and thought content normal.       Recent Results (from the past 2016 hour(s))   CBC (NO DIFF)    Collection Time: 02/27/20 12:30 PM   Result Value Ref Range    WBC 8.64 4.5 - 11.0 10*3/uL    RBC 4.95 4.5 - 5.9 10*6/uL    Hemoglobin 14.0 13.5 - 17.5 g/dL    Hematocrit 44.4 41.0 - 53.0 %    MCV 89.7 80.0 - 100.0 fL    MCH 28.3 26.0 - 34.0 pg    MCHC 31.5 31.0 - 37.0 g/dL    RDW 12.3 12.0 - 16.8 %    Platelets 287 140 - 440 10*3/uL    MPV 10.5 6.7 - 10.8 fL   VITAMIN D 25 HYDROXY    Collection Time: 02/27/20 12:30 PM   Result Value Ref Range     25 Hydroxy, Vitamin D 54 >31 ng/mL   T3, FREE    Collection Time: 02/27/20 12:30 PM   Result Value Ref Range    T3, Free 5.81 3.10 - 6.80 pmol/L   T4, FREE    Collection Time: 02/27/20 12:30 PM   Result Value Ref Range    Free T4 2.39 (H) 0.93 - 1.70 ng/dL   TSH    Collection Time: 02/27/20 12:30 PM   Result Value Ref Range    TSH 0.055 (L) 0.27 - 4.20 u(iU)/mL     Assessment/Plan   Sami was seen today for pain and hypothyroidism.    Diagnoses and all orders for this visit:    Myalgia    Pain syndrome, chronic    Elevated ferritin    Discussed with patient via video visit utilizing [employer].  Time of visit in care, review and discussion of 25 minutes.    Discussed the endocrine care.  He will follow up with endocrinology as noted and have labs done in May.  At that time will see if he can have the ferritin level rechecked.    Discussed the chronic pain and to use the tramadol as needed only.  WIN run and reviewed.  Risks of the medication include but are not limited to fatigue, somnolence, increased risk of falls, constipation, allergic reaction, dependence, and addiction.

## 2020-05-06 ENCOUNTER — RESULTS ENCOUNTER (OUTPATIENT)
Dept: FAMILY MEDICINE CLINIC | Facility: CLINIC | Age: 40
End: 2020-05-06

## 2020-05-06 DIAGNOSIS — R79.89 ELEVATED FERRITIN: ICD-10-CM

## 2020-07-27 ENCOUNTER — TELEMEDICINE (OUTPATIENT)
Dept: FAMILY MEDICINE CLINIC | Facility: CLINIC | Age: 40
End: 2020-07-27

## 2020-07-27 DIAGNOSIS — M54.10 RADICULOPATHY AFFECTING UPPER EXTREMITY: ICD-10-CM

## 2020-07-27 DIAGNOSIS — G89.4 PAIN SYNDROME, CHRONIC: ICD-10-CM

## 2020-07-27 PROBLEM — E29.1 MALE HYPOGONADISM: Status: ACTIVE | Noted: 2020-02-29

## 2020-07-27 PROBLEM — E55.9 VITAMIN D DEFICIENCY: Status: ACTIVE | Noted: 2020-02-29

## 2020-07-27 PROCEDURE — 99213 OFFICE O/P EST LOW 20 MIN: CPT | Performed by: INTERNAL MEDICINE

## 2020-07-27 RX ORDER — GABAPENTIN 100 MG/1
100 CAPSULE ORAL 3 TIMES DAILY
Qty: 90 CAPSULE | Refills: 1 | Status: SHIPPED | OUTPATIENT
Start: 2020-07-27 | End: 2020-08-26 | Stop reason: SDUPTHER

## 2020-07-27 RX ORDER — LEVOTHYROXINE SODIUM 0.15 MG/1
150 TABLET ORAL DAILY
COMMUNITY
Start: 2020-06-18 | End: 2020-09-16

## 2020-07-27 RX ORDER — TRAMADOL HYDROCHLORIDE 50 MG/1
50 TABLET ORAL EVERY 6 HOURS PRN
Qty: 30 TABLET | Refills: 0 | Status: SHIPPED | OUTPATIENT
Start: 2020-07-27 | End: 2020-10-07 | Stop reason: SDUPTHER

## 2020-07-27 NOTE — PROGRESS NOTES
"Francis Nayak is a 40 y.o. male.     Chief Complaint   Patient presents with   • Pain       History of Present Illness   You have chosen to receive care through a telehealth visit.  Do you consent to use a video/audio connection for your medical care today? Yes  Video visit completed utilizing [employer] video conferencing connection.    Continues with right arm and shoulder with neck discomfort and tired feeling for the last \"few months\"  Not really painful for fatigued.  Worsens as the day progresses.  Strength and sensation/feeling are \"fine\".  Has a \"vibrating feeling sometimes on the inside of the right biceps\"  and mainly at night when goes to bed.  Always in the same spot.  No pain.  Has been having some persistent cramps in the feet and calves.  Has constant low back pain.  Continues with the chronic burning pain in the left side and chest worse in the mornings.  Uses the tramadol as needed which he does once a week but it causes him not to be hungry.  Patient is seeing endocrinology Dr Bryant and no new changes at this time.    The following portions of the patient's history were reviewed and updated as appropriate: allergies, current medications, past family history, past medical history, past social history, past surgical history and problem list.    Depression Screen:  No flowsheet data found.    Past Medical History:   Diagnosis Date   • Alopecia    • BMI 25.0-25.9,adult    • Elevated ferritin    • Encounter for immunization    • Esophageal reflux    • History of long-term treatment with high-risk medication    • Hives    • Hyperlipidemia    • Hypothyroidism    • Left-sided chest wall pain    • Pain syndrome, chronic    • Pituitary adenoma (CMS/HCC)    • Radiation retinopathy    • Rosacea    • Scoliosis    • Seasonal allergies    • Sensorineural hearing loss (SNHL), bilateral    • Vision loss, left eye        Past Surgical History:   Procedure Laterality Date   • CATARACT EXTRACTION  2004   • " EYE SURGERY  1994    results Strabismus   • SINUS SURGERY  1990   • THYROID SURGERY     • TOTAL THYROIDECTOMY  10/02/2013    benign folicular neoplasms       Family History   Problem Relation Age of Onset   • Lymphoma Father    • Lymphoma Maternal Grandmother    • Heart disease Maternal Grandmother         acute MI   • Coronary artery disease Maternal Grandmother        Social History     Socioeconomic History   • Marital status: Single     Spouse name: Not on file   • Number of children: Not on file   • Years of education: Not on file   • Highest education level: Not on file   Tobacco Use   • Smoking status: Never Smoker   • Smokeless tobacco: Never Used   Substance and Sexual Activity   • Alcohol use: Yes     Frequency: 2-3 times a week     Drinks per session: 5 or 6     Binge frequency: Monthly   • Drug use: No   • Sexual activity: Defer       Current Outpatient Medications   Medication Sig Dispense Refill   • Cholecalciferol (VITAMIN D3) 125 MCG (5000 UT) capsule capsule Take 5,000 Units by mouth Daily.     • levothyroxine (SYNTHROID, LEVOTHROID) 150 MCG tablet Take 150 mcg by mouth Daily.     • gabapentin (NEURONTIN) 100 MG capsule Take 1 capsule by mouth 3 (Three) Times a Day. 90 capsule 1   • mometasone (ELOCON) 0.1 % cream Apply  topically to the appropriate area as directed Daily.     • omeprazole (priLOSEC) 40 MG capsule Take 20 mg by mouth Daily.     • pravastatin (PRAVACHOL) 80 MG tablet Take 1 tablet by mouth Every Evening. 90 tablet 3   • traMADol (ULTRAM) 50 MG tablet Take 1 tablet by mouth Every 6 (Six) Hours As Needed for Moderate Pain . 30 tablet 0   • Triamcinolone Acetonide (NASACORT ALLERGY 24HR) 55 MCG/ACT nasal inhaler 2 sprays into the nostril(s) as directed by provider Daily.       No current facility-administered medications for this visit.        Review of Systems   Constitutional: Negative for activity change, appetite change, fatigue, fever, unexpected weight gain and unexpected weight  loss.   HENT: Negative for nosebleeds, rhinorrhea, trouble swallowing and voice change.    Eyes: Negative for visual disturbance.   Respiratory: Negative for cough, chest tightness, shortness of breath and wheezing.    Cardiovascular: Negative for chest pain, palpitations and leg swelling.   Gastrointestinal: Negative for abdominal pain, blood in stool, constipation, diarrhea, nausea, vomiting, GERD and indigestion.   Genitourinary: Negative for dysuria, frequency and hematuria.   Musculoskeletal: Negative for arthralgias, back pain and myalgias.   Skin: Negative for rash and bruise.   Neurological: Negative for dizziness, tremors, weakness, light-headedness, numbness, headache and memory problem.   Hematological: Negative for adenopathy. Does not bruise/bleed easily.   Psychiatric/Behavioral: Negative for sleep disturbance and depressed mood. The patient is not nervous/anxious.        Objective   There were no vitals taken for this visit.    Physical Exam   Constitutional: He appears well-developed and well-nourished. No distress.   Pulmonary/Chest: Effort normal.  No respiratory distress.  Skin: He is not diaphoretic.   Psychiatric: His behavior is normal. Thought content is normal. He does not express abnormal judgement.       Recent Results (from the past 2016 hour(s))   FERRITIN    Collection Time: 05/06/20 11:32 AM   Result Value Ref Range    Ferritin 424.0 17.9 - 464.0 ng/mL   LIPID PANEL    Collection Time: 05/06/20 11:32 AM   Result Value Ref Range    Triglycerides 132 0 - 149 mg/dL    Total Cholesterol 169 0 - 199 mg/dL    HDL Cholesterol 42 >39 mg/dL    LDL Cholesterol  101 (H) 0 - 100 mg/dL    VLDL Cholesterol 26 5 - 40 mg/dL    Chol/HDL Ratio 4.0 RATIO    Fasting? Yes    VITAMIN D 25 HYDROXY    Collection Time: 05/06/20 11:32 AM   Result Value Ref Range    25 Hydroxy, Vitamin D 37 >31 ng/mL   FOLLICLE STIMULATING HORMONE    Collection Time: 05/06/20 11:32 AM   Result Value Ref Range    FSH 16.7 (H) 1.6  - 9.7 m(iU)/mL   T4, FREE    Collection Time: 05/06/20 11:32 AM   Result Value Ref Range    Free T4 2.04 (H) 0.93 - 1.70 ng/dL   TSH    Collection Time: 05/06/20 11:32 AM   Result Value Ref Range    TSH 0.268 (L) 0.27 - 4.20 u(iU)/mL     Assessment/Plan   Sami was seen today for pain.    Diagnoses and all orders for this visit:    Radiculopathy affecting upper extremity  -     traMADol (ULTRAM) 50 MG tablet; Take 1 tablet by mouth Every 6 (Six) Hours As Needed for Moderate Pain .  -     gabapentin (NEURONTIN) 100 MG capsule; Take 1 capsule by mouth 3 (Three) Times a Day.    Pain syndrome, chronic  -     traMADol (ULTRAM) 50 MG tablet; Take 1 tablet by mouth Every 6 (Six) Hours As Needed for Moderate Pain .  -     gabapentin (NEURONTIN) 100 MG capsule; Take 1 capsule by mouth 3 (Three) Times a Day.    Discussed with patient concerning his chronic pains and back pain with radiculopathy.  Continue the tramadol for which she is only taking maybe once or twice a week and use only as needed but will also try the gabapentin starting a low-dose 100 mg 3 times a day and may increase as we gather to see how he is doing.  WIN run and reviewed.  Risks of the medication include but are not limited to fatigue, somnolence, increased risk of falls, constipation, allergic reaction, dependence, and addiction.  Discussed the possible interactions of tramadol and gabapentin as well and instructed do not take at the same time and if possible do not take the tramadol at all.  Patient will follow-up in approximately 2 months which time we will reassess and will likely need labs.      Video visit completed.  Time of visit in discussion and care of patient and one-on-one care not including charting time of 16 minutes.

## 2020-08-26 DIAGNOSIS — M54.10 RADICULOPATHY AFFECTING UPPER EXTREMITY: ICD-10-CM

## 2020-08-26 DIAGNOSIS — G89.4 PAIN SYNDROME, CHRONIC: ICD-10-CM

## 2020-08-26 RX ORDER — GABAPENTIN 100 MG/1
100 CAPSULE ORAL 3 TIMES DAILY
Qty: 90 CAPSULE | Refills: 1 | Status: SHIPPED | OUTPATIENT
Start: 2020-08-26 | End: 2020-10-07

## 2020-10-07 ENCOUNTER — OFFICE VISIT (OUTPATIENT)
Dept: FAMILY MEDICINE CLINIC | Facility: CLINIC | Age: 40
End: 2020-10-07

## 2020-10-07 VITALS
SYSTOLIC BLOOD PRESSURE: 118 MMHG | OXYGEN SATURATION: 98 % | BODY MASS INDEX: 24.36 KG/M2 | WEIGHT: 174 LBS | DIASTOLIC BLOOD PRESSURE: 84 MMHG | HEIGHT: 71 IN | HEART RATE: 74 BPM | TEMPERATURE: 97.6 F

## 2020-10-07 DIAGNOSIS — G89.4 PAIN SYNDROME, CHRONIC: ICD-10-CM

## 2020-10-07 DIAGNOSIS — M54.10 RADICULOPATHY AFFECTING UPPER EXTREMITY: ICD-10-CM

## 2020-10-07 DIAGNOSIS — Z00.00 ANNUAL PHYSICAL EXAM: Primary | ICD-10-CM

## 2020-10-07 DIAGNOSIS — E78.5 HYPERLIPIDEMIA, UNSPECIFIED HYPERLIPIDEMIA TYPE: ICD-10-CM

## 2020-10-07 PROCEDURE — 99396 PREV VISIT EST AGE 40-64: CPT | Performed by: INTERNAL MEDICINE

## 2020-10-07 RX ORDER — LEVOTHYROXINE SODIUM 0.15 MG/1
150 TABLET ORAL DAILY
COMMUNITY
Start: 2020-09-29 | End: 2023-03-21 | Stop reason: SDUPTHER

## 2020-10-07 RX ORDER — TRAMADOL HYDROCHLORIDE 50 MG/1
50 TABLET ORAL EVERY 6 HOURS PRN
Qty: 30 TABLET | Refills: 0 | Status: SHIPPED | OUTPATIENT
Start: 2020-10-07 | End: 2021-03-09 | Stop reason: SDUPTHER

## 2020-10-07 NOTE — PROGRESS NOTES
Chief Complaint   Patient presents with   • Labs Only     fasting   • Annual Exam       HPI:  Sami Nayak, -1980, is a 40 y.o. male who presents for an annual physical.    Follow-up for cholesterol.  Currently, has been feeling well without any myalgias, muscle aches, weakness, numbness, chest pain, short of breath or other issues.  Currently, is adherent with medication regimen of pravastatin 80 mg and denies medication side effects. Is due for lab follow-up.    Still having the right arm and shoulder / neck discomfort that is mild.  Still with intermittent vibrating sensation in the right biceps and is doing well overall.  Is continuing to use the gabapentin but no improvement and wants to stop it.  Has chronic back pain that is improved with stretching and tramadol as needed.    Is seeing endocrinology Dr Bryant and is following the hormones and vitamin D.    Recent Hospitalizations:  No hospitalization(s) within the last year..    Current Medical Providers:  Patient Care Team:  Jose Armando Ramirez MD as PCP - General (Internal Medicine)  Poonam Bryant (Endocrinology)  Rickey Mendoza MD as Consulting Physician (Otolaryngology)    Compared to one year ago, the patient feels his physical health is the same and his mental health is the same.    Depression Screen:  No flowsheet data found.    Past Medical/Family/Social History:  The following portions of the patient's history were reviewed and updated as appropriate: allergies, current medications, past family history, past medical history, past social history, past surgical history and problem list.    Allergies   Allergen Reactions   • Penicillins Anaphylaxis     [facility] Allergy Text Annotation: penicillins   • Latex Hives   • Morphine Hives     [facility] Allergy Text Annotation: morphine         Current Outpatient Medications:   •  Cholecalciferol (VITAMIN D3) 125 MCG (5000 UT) capsule capsule, Take 5,000 Units by mouth Daily., Disp: , Rfl:   •  levothyroxine  (SYNTHROID, LEVOTHROID) 150 MCG tablet, Take 150 mcg by mouth Daily., Disp: , Rfl:   •  mometasone (ELOCON) 0.1 % cream, Apply  topically to the appropriate area as directed Daily., Disp: , Rfl:   •  omeprazole (priLOSEC) 40 MG capsule, Take 20 mg by mouth Daily., Disp: , Rfl:   •  pravastatin (PRAVACHOL) 80 MG tablet, Take 1 tablet by mouth Every Evening., Disp: 90 tablet, Rfl: 3  •  traMADol (ULTRAM) 50 MG tablet, Take 1 tablet by mouth Every 6 (Six) Hours As Needed for Moderate Pain ., Disp: 30 tablet, Rfl: 0  •  Triamcinolone Acetonide (NASACORT ALLERGY 24HR) 55 MCG/ACT nasal inhaler, 2 sprays into the nostril(s) as directed by provider Daily., Disp: , Rfl:     Recommended to use the tramadol as needed only and continue the gabapentin.    Family History   Problem Relation Age of Onset   • Lymphoma Father    • Lymphoma Maternal Grandmother    • Heart disease Maternal Grandmother         acute MI   • Coronary artery disease Maternal Grandmother        Social History     Tobacco Use   • Smoking status: Never Smoker   • Smokeless tobacco: Never Used   Substance Use Topics   • Alcohol use: Yes     Frequency: 2-3 times a week     Drinks per session: 5 or 6     Binge frequency: Monthly       Past Surgical History:   Procedure Laterality Date   • CATARACT EXTRACTION  2004   • EYE SURGERY  1994    results Strabismus   • SINUS SURGERY  1990   • THYROID SURGERY     • TOTAL THYROIDECTOMY  10/02/2013    benign folicular neoplasms       Patient Active Problem List   Diagnosis   • Scoliosis   • Radiation retinopathy   • Hypothyroidism   • Hyperlipidemia   • Esophageal reflux   • Elevated ferritin   • Alopecia   • Multinodular thyroid   • Pain syndrome, chronic   • Radiculopathy affecting upper extremity   • Elevated TSH   • Myalgia   • Chronic fatigue   • Vitamin D deficiency   • Male hypogonadism   • Annual physical exam       Review of Systems   Constitutional: Negative for activity change, appetite change, fatigue, fever,  "unexpected weight gain and unexpected weight loss.   HENT: Negative for nosebleeds, rhinorrhea, trouble swallowing and voice change.    Eyes: Negative for visual disturbance.   Respiratory: Negative for cough, chest tightness, shortness of breath and wheezing.    Cardiovascular: Negative for chest pain, palpitations and leg swelling.   Gastrointestinal: Negative for abdominal pain, blood in stool, constipation, diarrhea, nausea, vomiting, GERD and indigestion.   Genitourinary: Negative for dysuria, frequency and hematuria.   Musculoskeletal: Negative for arthralgias, back pain and myalgias.   Skin: Negative for rash and bruise.   Neurological: Negative for dizziness, tremors, weakness, light-headedness, numbness, headache and memory problem.   Hematological: Negative for adenopathy. Does not bruise/bleed easily.   Psychiatric/Behavioral: Negative for sleep disturbance and depressed mood. The patient is not nervous/anxious.        Objective     Vitals:    10/07/20 1131   BP: 118/84   BP Location: Left arm   Patient Position: Sitting   Cuff Size: Adult   Pulse: 74   Temp: 97.6 °F (36.4 °C)   TempSrc: Temporal   SpO2: 98%   Weight: 78.9 kg (174 lb)   Height: 180.3 cm (70.98\")       Patient's Body mass index is 24.28 kg/m². BMI is within normal parameters. No follow-up required..      No exam data present    Physical Exam  Vitals signs and nursing note reviewed.   Constitutional:       General: He is not in acute distress.     Appearance: He is well-developed. He is not diaphoretic.   HENT:      Head: Normocephalic and atraumatic.      Right Ear: External ear normal.      Left Ear: External ear normal.      Nose: Nose normal.   Eyes:      Conjunctiva/sclera: Conjunctivae normal.      Pupils: Pupils are equal, round, and reactive to light.   Neck:      Musculoskeletal: Normal range of motion and neck supple.      Thyroid: No thyromegaly.      Trachea: No tracheal deviation.   Cardiovascular:      Rate and Rhythm: Normal " rate and regular rhythm.      Heart sounds: Normal heart sounds. No murmur. No friction rub. No gallop.    Pulmonary:      Effort: Pulmonary effort is normal. No respiratory distress.      Breath sounds: Normal breath sounds.   Abdominal:      General: Bowel sounds are normal.      Palpations: Abdomen is soft. There is no mass.      Tenderness: There is no abdominal tenderness. There is no guarding.   Musculoskeletal: Normal range of motion.   Lymphadenopathy:      Cervical: No cervical adenopathy.   Skin:     General: Skin is warm and dry.      Capillary Refill: Capillary refill takes less than 2 seconds.      Findings: No rash.   Neurological:      Mental Status: He is alert and oriented to person, place, and time.      Motor: No abnormal muscle tone.      Deep Tendon Reflexes: Reflexes normal.   Psychiatric:         Behavior: Behavior normal.         Thought Content: Thought content normal.         Judgment: Judgment normal.         Recent Lab Results:  Lab Results   Component Value Date    GLU 97 10/23/2019     Lab Results   Component Value Date    TRIG 132 05/06/2020    HDL 42 05/06/2020    VLDL 26 05/06/2020       Assessment/Plan   Age-appropriate Screening Schedule:  Refer to the list below for future screening recommendations based on patient's age, sex and/or medical conditions.      Health Maintenance   Topic Date Due   • LIPID PANEL  05/06/2021   • TDAP/TD VACCINES (2 - Td) 08/01/2022   • INFLUENZA VACCINE  Completed       Diagnoses and all orders for this visit:    1. Annual physical exam (Primary)    2. Hyperlipidemia, unspecified hyperlipidemia type  -     Comprehensive Metabolic Panel  -     Lipid Panel    3. Radiculopathy affecting upper extremity  -     traMADol (ULTRAM) 50 MG tablet; Take 1 tablet by mouth Every 6 (Six) Hours As Needed for Moderate Pain .  Dispense: 30 tablet; Refill: 0    4. Pain syndrome, chronic  -     traMADol (ULTRAM) 50 MG tablet; Take 1 tablet by mouth Every 6 (Six) Hours As  Needed for Moderate Pain .  Dispense: 30 tablet; Refill: 0        Annual wellness visit reviewed with patient.  All past history, medications, social history, and problem list were reviewed.  Discussed advanced directives and living will.  Patient has living will: Living will: no and information packet given to patient to complete at home and bring copy to office.  Will check the labs as ordered above to evaluate the blood sugars, kidney, liver, cholesterol for screening.  Discussed flu shot recommended to get the influenza vaccine annually in the fall.  Encouraged follow-up with the eye doctor on annual basis.  Discussed weight and encouraged exercise as tolerated while following a healthy diet.  Reviewed sexual health and safe sex practices.  Follow up with current specialists as needed.     An After Visit Summary with all of these plans were given to the patient.        Follow Up:  Return in about 6 months (around 4/7/2021) for Next scheduled follow up.

## 2020-10-08 LAB
ALBUMIN SERPL-MCNC: 5 G/DL (ref 3.5–5.2)
ALBUMIN/GLOB SERPL: 1.8 G/DL
ALP SERPL-CCNC: 100 U/L (ref 39–117)
ALT SERPL-CCNC: 30 U/L (ref 1–41)
AST SERPL-CCNC: 27 U/L (ref 1–40)
BILIRUB SERPL-MCNC: 0.4 MG/DL (ref 0–1.2)
BUN SERPL-MCNC: 14 MG/DL (ref 6–20)
BUN/CREAT SERPL: 12.5 (ref 7–25)
CALCIUM SERPL-MCNC: 9.8 MG/DL (ref 8.6–10.5)
CHLORIDE SERPL-SCNC: 101 MMOL/L (ref 98–107)
CHOLEST SERPL-MCNC: 168 MG/DL (ref 0–200)
CO2 SERPL-SCNC: 33.3 MMOL/L (ref 22–29)
CREAT SERPL-MCNC: 1.12 MG/DL (ref 0.76–1.27)
GLOBULIN SER CALC-MCNC: 2.8 GM/DL
GLUCOSE SERPL-MCNC: 93 MG/DL (ref 65–99)
HDLC SERPL-MCNC: 51 MG/DL (ref 40–60)
LDLC SERPL CALC-MCNC: 91 MG/DL (ref 0–100)
POTASSIUM SERPL-SCNC: 5.2 MMOL/L (ref 3.5–5.2)
PROT SERPL-MCNC: 7.8 G/DL (ref 6–8.5)
SODIUM SERPL-SCNC: 144 MMOL/L (ref 136–145)
TRIGL SERPL-MCNC: 128 MG/DL (ref 0–150)
VLDLC SERPL CALC-MCNC: 25.6 MG/DL

## 2021-03-09 ENCOUNTER — OFFICE VISIT (OUTPATIENT)
Dept: FAMILY MEDICINE CLINIC | Facility: CLINIC | Age: 41
End: 2021-03-09

## 2021-03-09 VITALS
TEMPERATURE: 97.7 F | HEIGHT: 71 IN | DIASTOLIC BLOOD PRESSURE: 80 MMHG | WEIGHT: 181 LBS | OXYGEN SATURATION: 100 % | BODY MASS INDEX: 25.34 KG/M2 | HEART RATE: 70 BPM | SYSTOLIC BLOOD PRESSURE: 118 MMHG

## 2021-03-09 DIAGNOSIS — E06.3 HYPOTHYROIDISM DUE TO HASHIMOTO'S THYROIDITIS: ICD-10-CM

## 2021-03-09 DIAGNOSIS — G89.4 PAIN SYNDROME, CHRONIC: ICD-10-CM

## 2021-03-09 DIAGNOSIS — E03.8 HYPOTHYROIDISM DUE TO HASHIMOTO'S THYROIDITIS: ICD-10-CM

## 2021-03-09 DIAGNOSIS — E78.5 HYPERLIPIDEMIA, UNSPECIFIED HYPERLIPIDEMIA TYPE: Primary | ICD-10-CM

## 2021-03-09 DIAGNOSIS — M54.10 RADICULOPATHY AFFECTING UPPER EXTREMITY: ICD-10-CM

## 2021-03-09 PROCEDURE — 99213 OFFICE O/P EST LOW 20 MIN: CPT | Performed by: INTERNAL MEDICINE

## 2021-03-09 RX ORDER — DESONIDE 0.5 MG/ML
LOTION TOPICAL
COMMUNITY
Start: 2021-01-29

## 2021-03-09 RX ORDER — TRAMADOL HYDROCHLORIDE 50 MG/1
50 TABLET ORAL EVERY 6 HOURS PRN
Qty: 30 TABLET | Refills: 0 | Status: SHIPPED | OUTPATIENT
Start: 2021-03-09 | End: 2021-09-21 | Stop reason: SDUPTHER

## 2021-03-09 RX ORDER — PRAVASTATIN SODIUM 80 MG/1
80 TABLET ORAL DAILY
COMMUNITY
Start: 2021-03-04 | End: 2023-03-21

## 2021-03-09 NOTE — PROGRESS NOTES
Chief Complaint   Patient presents with   • Hypothyroidism   • Annual Exam       HPI:  Sami Nayak, -1980, is a 40 y.o. male who presents for an annual physical.    Here for annual physical.  Doing well without difficulty.  Had Moh's procedure for skin cancer that is healing well.    Follow-up for thyroid.  Denies fatigue, weakness, constipation/diarrhea, hair/skin changes, weight gain/loss, depression/anxiety, rashes, palpitations, swelling, chest pain, shortness of breath or other issues.  Has been compliant with taking the medication of levothyroxine 150 mcg with no recent changes.  Denies any difficulty with the current medication.  Is being followed by endocrinology.    Follow-up for cholesterol.  Currently, has been feeling well without any myalgias, muscle aches, weakness, numbness, chest pain, short of breath or other issues.  Currently, is adherent with medication regimen of pravastatin 40 mg and denies medication side effects. Is due for lab follow-up.    Recent Hospitalizations:  No hospitalization(s) within the last year..    Current Medical Providers:  Patient Care Team:  Jose Armando Ramirez MD as PCP - General (Internal Medicine)  Poonam Bryant (Endocrinology)  Rickey Mendoza MD as Consulting Physician (Otolaryngology)  Charbel Foote MD (Dermatology)  Candy White MD as Consulting Physician (Dermatology)    Compared to one year ago, the patient feels his physical health is the same and his mental health is the same.    Depression Screen:  No flowsheet data found.    Past Medical/Family/Social History:  The following portions of the patient's history were reviewed and updated as appropriate: allergies, current medications, past family history, past medical history, past social history, past surgical history and problem list.    Allergies   Allergen Reactions   • Penicillins Anaphylaxis     Cerner Allergy Text Annotation: penicillins   • Latex Hives   • Morphine Hives     Cerner  Allergy Text Annotation: morphine         Current Outpatient Medications:   •  Cholecalciferol (VITAMIN D3) 125 MCG (5000 UT) capsule capsule, Take 5,000 Units by mouth Daily., Disp: , Rfl:   •  desonide (DESOWEN) 0.05 % lotion, APPLY TWICE DAILY AS NEEDED TO ECZEMA, Disp: , Rfl:   •  levothyroxine (SYNTHROID, LEVOTHROID) 150 MCG tablet, Take 150 mcg by mouth Daily., Disp: , Rfl:   •  mometasone (ELOCON) 0.1 % cream, Apply  topically to the appropriate area as directed Daily., Disp: , Rfl:   •  omeprazole (priLOSEC) 40 MG capsule, Take 20 mg by mouth Daily., Disp: , Rfl:   •  pravastatin (PRAVACHOL) 80 MG tablet, Take 80 mg by mouth Daily., Disp: , Rfl:   •  traMADol (ULTRAM) 50 MG tablet, Take 1 tablet by mouth Every 6 (Six) Hours As Needed for Moderate Pain ., Disp: 30 tablet, Rfl: 0  •  Triamcinolone Acetonide (NASACORT ALLERGY 24HR) 55 MCG/ACT nasal inhaler, 2 sprays into the nostril(s) as directed by provider Daily., Disp: , Rfl:     Current medication list contains no high risk medications.  No harmful drug interactions have been identified.     Family History   Problem Relation Age of Onset   • Lymphoma Father    • Lymphoma Maternal Grandmother    • Heart disease Maternal Grandmother         acute MI   • Coronary artery disease Maternal Grandmother        Social History     Tobacco Use   • Smoking status: Never Smoker   • Smokeless tobacco: Never Used   Substance Use Topics   • Alcohol use: Yes       Past Surgical History:   Procedure Laterality Date   • CATARACT EXTRACTION  2004   • EYE SURGERY  1994    results Strabismus   • MOHS SURGERY Right 02/23/2021    ear   • SINUS SURGERY  1990   • THYROID SURGERY     • TOTAL THYROIDECTOMY  10/02/2013    benign folicular neoplasms       Patient Active Problem List   Diagnosis   • Scoliosis   • Radiation retinopathy   • Hypothyroidism   • Hyperlipidemia   • Esophageal reflux   • Elevated ferritin   • Alopecia   • Multinodular thyroid   • Pain syndrome, chronic   •  "Radiculopathy affecting upper extremity   • Elevated TSH   • Myalgia   • Chronic fatigue   • Vitamin D deficiency   • Male hypogonadism   • Annual physical exam       Review of Systems   Constitutional: Negative for activity change, appetite change, fatigue, fever, unexpected weight gain and unexpected weight loss.   HENT: Negative for nosebleeds, rhinorrhea, trouble swallowing and voice change.    Eyes: Negative for visual disturbance.   Respiratory: Negative for cough, chest tightness, shortness of breath and wheezing.    Cardiovascular: Negative for chest pain, palpitations and leg swelling.   Gastrointestinal: Negative for abdominal pain, blood in stool, constipation, diarrhea, nausea, vomiting, GERD and indigestion.   Genitourinary: Negative for dysuria, frequency and hematuria.   Musculoskeletal: Negative for arthralgias, back pain and myalgias.   Skin: Negative for rash and bruise.   Neurological: Negative for dizziness, tremors, weakness, light-headedness, numbness, headache and memory problem.   Hematological: Negative for adenopathy. Does not bruise/bleed easily.   Psychiatric/Behavioral: Negative for sleep disturbance and depressed mood. The patient is not nervous/anxious.        Objective     Vitals:    03/09/21 1332   BP: 118/80   BP Location: Left arm   Patient Position: Sitting   Cuff Size: Adult   Pulse: 70   Temp: 97.7 °F (36.5 °C)   TempSrc: Temporal   SpO2: 100%   Weight: 82.1 kg (181 lb)   Height: 180.3 cm (70.98\")       Patient's Body mass index is 25.26 kg/m². BMI is within normal parameters. No follow-up required..      No exam data present    Physical Exam  Vitals and nursing note reviewed.   Constitutional:       General: He is not in acute distress.     Appearance: He is well-developed. He is not diaphoretic.   HENT:      Head: Normocephalic and atraumatic.      Right Ear: External ear normal.      Left Ear: External ear normal.      Ears:      Comments: Right ear bandaged post Mohs " procedure with skin graft.     Nose: Nose normal.   Eyes:      Conjunctiva/sclera: Conjunctivae normal.      Pupils: Pupils are equal, round, and reactive to light.   Neck:      Thyroid: No thyromegaly.      Trachea: No tracheal deviation.   Cardiovascular:      Rate and Rhythm: Normal rate and regular rhythm.      Heart sounds: Normal heart sounds. No murmur. No friction rub. No gallop.    Pulmonary:      Effort: Pulmonary effort is normal. No respiratory distress.      Breath sounds: Normal breath sounds.   Abdominal:      General: Bowel sounds are normal.      Palpations: Abdomen is soft. There is no mass.      Tenderness: There is no abdominal tenderness. There is no guarding.   Musculoskeletal:         General: Normal range of motion.      Cervical back: Normal range of motion and neck supple.   Lymphadenopathy:      Cervical: No cervical adenopathy.   Skin:     General: Skin is warm and dry.      Capillary Refill: Capillary refill takes less than 2 seconds.      Findings: No rash.   Neurological:      Mental Status: He is alert and oriented to person, place, and time.      Motor: No abnormal muscle tone.      Deep Tendon Reflexes: Reflexes normal.   Psychiatric:         Behavior: Behavior normal.         Thought Content: Thought content normal.         Judgment: Judgment normal.         Recent Lab Results:  Lab Results   Component Value Date    GLU 93 10/07/2020     Lab Results   Component Value Date    TRIG 128 10/07/2020    HDL 51 10/07/2020    VLDL 25.6 10/07/2020       Assessment/Plan   Age-appropriate Screening Schedule:  Refer to the list below for future screening recommendations based on patient's age, sex and/or medical conditions.      Health Maintenance   Topic Date Due   • LIPID PANEL  10/07/2021   • TDAP/TD VACCINES (2 - Td) 08/01/2022   • INFLUENZA VACCINE  Completed       Diagnoses and all orders for this visit:    1. Hyperlipidemia, unspecified hyperlipidemia type (Primary)    2. Hypothyroidism  due to Hashimoto's thyroiditis    3. Pain syndrome, chronic  -     traMADol (ULTRAM) 50 MG tablet; Take 1 tablet by mouth Every 6 (Six) Hours As Needed for Moderate Pain .  Dispense: 30 tablet; Refill: 0    4. Radiculopathy affecting upper extremity  -     traMADol (ULTRAM) 50 MG tablet; Take 1 tablet by mouth Every 6 (Six) Hours As Needed for Moderate Pain .  Dispense: 30 tablet; Refill: 0    All past history, medications, social history, and problem list were reviewed.  Discussed advanced directives and living will.  Patient has living will: Living will: Directive already scanned in chart. Discussed flu shot recommended to get the influenza vaccine annually in the fall.  Encouraged follow-up with the eye doctor on annual basis.  Discussed weight and encouraged exercise as tolerated while following a healthy diet.  Reviewed sexual health and safe sex practices.  Follow up with current specialists as needed.  Reviewed labs and noted that his thyroid and lipid panel been recently done.  No need to repeat any labs now.  Continue the current medications including the tramadol.  WIN run and reviewed.  Risks of the medication include but are not limited to fatigue, somnolence, increased risk of falls, constipation, allergic reaction, dependence, and addiction.    An After Visit Summary with all of these plans were given to the patient.        Follow Up:  Return in about 6 months (around 9/9/2021) for Next scheduled follow up.

## 2021-03-22 NOTE — TELEPHONE ENCOUNTER
No loss of fluid/vaginal bleeding/regular contractions. NoFM  -Hypothyroidism. On replacement. Recheck labs 3wk  -Depression. Controlled on meds. Mood appropriate  -Anatomy US next visi  - Labor precautions. F/U 4-6WK    Ingris Armstrong Masters, DO       Testosterone 25mg 1% gel packet approved from 12/4/19-12/4/20

## 2021-09-21 ENCOUNTER — OFFICE VISIT (OUTPATIENT)
Dept: FAMILY MEDICINE CLINIC | Facility: CLINIC | Age: 41
End: 2021-09-21

## 2021-09-21 VITALS
WEIGHT: 183 LBS | TEMPERATURE: 97.9 F | BODY MASS INDEX: 25.62 KG/M2 | SYSTOLIC BLOOD PRESSURE: 122 MMHG | HEART RATE: 68 BPM | HEIGHT: 71 IN | OXYGEN SATURATION: 98 % | DIASTOLIC BLOOD PRESSURE: 86 MMHG

## 2021-09-21 DIAGNOSIS — R79.89 ELEVATED FERRITIN: ICD-10-CM

## 2021-09-21 DIAGNOSIS — M54.10 RADICULOPATHY AFFECTING UPPER EXTREMITY: ICD-10-CM

## 2021-09-21 DIAGNOSIS — Z12.11 SCREENING FOR COLON CANCER: ICD-10-CM

## 2021-09-21 DIAGNOSIS — E78.5 HYPERLIPIDEMIA, UNSPECIFIED HYPERLIPIDEMIA TYPE: Primary | ICD-10-CM

## 2021-09-21 DIAGNOSIS — Z80.0 FH: COLON CANCER IN RELATIVE <50 YEARS OLD: ICD-10-CM

## 2021-09-21 DIAGNOSIS — G89.4 PAIN SYNDROME, CHRONIC: ICD-10-CM

## 2021-09-21 PROBLEM — Z96.1 PRESENCE OF INTRAOCULAR LENS: Status: ACTIVE | Noted: 2021-03-29

## 2021-09-21 PROBLEM — H52.203 UNSPECIFIED ASTIGMATISM, BILATERAL: Status: ACTIVE | Noted: 2021-03-29

## 2021-09-21 PROBLEM — H52.13 BILATERAL MYOPIA: Status: ACTIVE | Noted: 2021-03-29

## 2021-09-21 PROCEDURE — 99214 OFFICE O/P EST MOD 30 MIN: CPT | Performed by: INTERNAL MEDICINE

## 2021-09-21 RX ORDER — TRAMADOL HYDROCHLORIDE 50 MG/1
50 TABLET ORAL EVERY 6 HOURS PRN
Qty: 30 TABLET | Refills: 0 | Status: SHIPPED | OUTPATIENT
Start: 2021-09-21 | End: 2022-03-22 | Stop reason: SDUPTHER

## 2021-09-21 NOTE — PROGRESS NOTES
Francis Nayak is a 41 y.o. male.     Chief Complaint   Patient presents with   • Hyperlipidemia       History of Present Illness   Answers for HPI/ROS submitted by the patient on 9/20/2021  What is the primary reason for your visit?: Other  Please describe your symptoms.: None, just here for 6 month check up.  Have you had these symptoms before?: No  How long have you been having these symptoms?: Greater than 2 weeks      Follow-up for thyroid.  Denies fatigue, weakness, constipation/diarrhea, hair/skin changes, weight gain/loss, depression/anxiety, rashes, palpitations, swelling, chest pain, shortness of breath or other issues.  Has been compliant with taking the medication of levothyroxine 150 mcg with no recent changes.  Denies any difficulty with the current medication.  Is being followed by endocrinology.     Follow-up for cholesterol.  Currently, has been feeling well without any myalgias, muscle aches, weakness, numbness, chest pain, short of breath or other issues.  Currently, is adherent with medication regimen of pravastatin 40 mg and denies medication side effects. Is due for lab follow-up.    The following portions of the patient's history were reviewed and updated as appropriate: allergies, current medications, past family history, past medical history, past social history, past surgical history and problem list.    Depression Screen:  No flowsheet data found.    Past Medical History:   Diagnosis Date   • Allergic 1991    Penicillin -morphine-tramadol   • Alopecia    • Asthma 1988   • BMI 25.0-25.9,adult    • Cancer (CMS/HCC) Meedulloblastoma-thyroid-basal cell   • Cataract 2004    Unsuccessful due to radiation   • Elevated ferritin    • Encounter for immunization    • Esophageal reflux    • History of long-term treatment with high-risk medication    • Hives    • Hyperlipidemia    • Hypothyroidism    • Left-sided chest wall pain    • Low back pain 1995    Due to radiation of spine   •  Neuromuscular disorder (CMS/HCC)     Due to radiation   • Pain syndrome, chronic    • Pituitary adenoma (CMS/HCC)    • Radiation retinopathy    • Rosacea    • Scoliosis    • Seasonal allergies    • Seizures (CMS/HCC)     Due to brain cancer treatment   • Sensorineural hearing loss (SNHL), bilateral    • Vision loss, left eye        Past Surgical History:   Procedure Laterality Date   • BRAIN SURGERY      Medulloblastoma   • CATARACT EXTRACTION     • EYE SURGERY      results Strabismus   • MOHS SURGERY Right 2021    ear   • SINUS SURGERY     • THYROID SURGERY     • TOTAL THYROIDECTOMY  10/02/2013    benign folicular neoplasms       Family History   Problem Relation Age of Onset   • Lymphoma Father    • Cancer Father         Diffuse large B cell lymphoma   • COPD Father    • Diabetes Father    • Heart disease Father         Due to cancer treatment   • Hyperlipidemia Father    • Kidney disease Father         Stones-1/3 kidney removed for benign tumors   • Lymphoma Maternal Grandmother    • Heart disease Maternal Grandmother         acute MI   • Coronary artery disease Maternal Grandmother    • Arthritis Maternal Grandmother    • COPD Maternal Grandmother    • Stroke Maternal Grandmother    • Thyroid disease Maternal Grandmother    • Other Maternal Grandmother         Addisons disease   • Alcohol abuse Maternal Grandfather    • Heart disease Maternal Grandfather         First heart attack age 56/ age 64 aneurysm of aorta   • Hyperlipidemia Maternal Grandfather    • Anxiety disorder Brother         After wife  of COVID-19   • Asthma Brother    • Anxiety disorder Brother         After death of sister-in-law; diagnosis of cancer of himself and his wife within two weeks   • Cancer Brother         3b colon cancer   • Anxiety disorder Mother         After waking up during surgery   • Arthritis Mother    • Hearing loss Mother    • Hyperlipidemia Mother    • Stroke Mother         TIA eye    • Cancer Paternal Grandfather         CLL leukemia   • Kidney disease Paternal Grandmother         Stones       Social History     Socioeconomic History   • Marital status: Single     Spouse name: Not on file   • Number of children: Not on file   • Years of education: Not on file   • Highest education level: Not on file   Tobacco Use   • Smoking status: Never Smoker   • Smokeless tobacco: Never Used   Substance and Sexual Activity   • Alcohol use: Yes     Alcohol/week: 4.0 standard drinks     Types: 4 Cans of beer per week   • Drug use: No   • Sexual activity: Not Currently     Partners: Female     Birth control/protection: Condom       Current Outpatient Medications   Medication Sig Dispense Refill   • Cholecalciferol (VITAMIN D3) 125 MCG (5000 UT) capsule capsule Take 5,000 Units by mouth Daily.     • desonide (DESOWEN) 0.05 % lotion APPLY TWICE DAILY AS NEEDED TO ECZEMA     • levothyroxine (SYNTHROID, LEVOTHROID) 150 MCG tablet Take 150 mcg by mouth Daily.     • mometasone (ELOCON) 0.1 % cream Apply  topically to the appropriate area as directed Daily.     • omeprazole (priLOSEC) 40 MG capsule Take 20 mg by mouth Daily.     • pravastatin (PRAVACHOL) 80 MG tablet Take 80 mg by mouth Daily.     • traMADol (ULTRAM) 50 MG tablet Take 1 tablet by mouth Every 6 (Six) Hours As Needed for Moderate Pain . 30 tablet 0   • Triamcinolone Acetonide (NASACORT ALLERGY 24HR) 55 MCG/ACT nasal inhaler 2 sprays into the nostril(s) as directed by provider Daily.       No current facility-administered medications for this visit.       Review of Systems   Constitutional: Negative for activity change, appetite change, fatigue, fever, unexpected weight gain and unexpected weight loss.   HENT: Negative for nosebleeds, rhinorrhea, trouble swallowing and voice change.    Eyes: Negative for visual disturbance.   Respiratory: Negative for cough, chest tightness, shortness of breath and wheezing.    Cardiovascular: Negative for chest pain,  "palpitations and leg swelling.   Gastrointestinal: Negative for abdominal pain, blood in stool, constipation, diarrhea, nausea, vomiting, GERD and indigestion.   Genitourinary: Negative for dysuria, frequency and hematuria.   Musculoskeletal: Negative for arthralgias, back pain and myalgias.   Skin: Negative for rash and bruise.   Neurological: Negative for dizziness, tremors, weakness, light-headedness, numbness, headache and memory problem.   Hematological: Negative for adenopathy. Does not bruise/bleed easily.   Psychiatric/Behavioral: Negative for sleep disturbance and depressed mood. The patient is not nervous/anxious.        Objective   /86 (BP Location: Left arm, Patient Position: Sitting, Cuff Size: Adult)   Pulse 68   Temp 97.9 °F (36.6 °C) (Temporal)   Ht 180.3 cm (70.98\")   Wt 83 kg (183 lb)   SpO2 98%   BMI 25.53 kg/m²     Physical Exam  Vitals and nursing note reviewed.   Constitutional:       General: He is not in acute distress.     Appearance: He is well-developed. He is not diaphoretic.   HENT:      Head: Normocephalic and atraumatic.      Right Ear: External ear normal.      Left Ear: External ear normal.      Nose: Nose normal.   Eyes:      Conjunctiva/sclera: Conjunctivae normal.      Pupils: Pupils are equal, round, and reactive to light.   Neck:      Thyroid: No thyromegaly.      Trachea: No tracheal deviation.   Cardiovascular:      Rate and Rhythm: Normal rate and regular rhythm.      Heart sounds: Normal heart sounds. No murmur heard.   No friction rub. No gallop.    Pulmonary:      Effort: Pulmonary effort is normal. No respiratory distress.      Breath sounds: Normal breath sounds.   Abdominal:      General: Bowel sounds are normal.      Palpations: Abdomen is soft. There is no mass.      Tenderness: There is no abdominal tenderness. There is no guarding.   Musculoskeletal:         General: Normal range of motion.      Cervical back: Normal range of motion and neck supple. "   Lymphadenopathy:      Cervical: No cervical adenopathy.   Skin:     General: Skin is warm and dry.      Capillary Refill: Capillary refill takes less than 2 seconds.      Findings: No rash.   Neurological:      Mental Status: He is alert and oriented to person, place, and time.      Motor: No abnormal muscle tone.      Deep Tendon Reflexes: Reflexes normal.   Psychiatric:         Behavior: Behavior normal.         Thought Content: Thought content normal.         Judgment: Judgment normal.         No results found for this or any previous visit (from the past 2016 hour(s)).  Assessment/Plan   Diagnoses and all orders for this visit:    1. Hyperlipidemia, unspecified hyperlipidemia type (Primary)  -     Comprehensive Metabolic Panel  -     Lipid Panel    2. Elevated ferritin  -     Ferritin    3. Screening for colon cancer  -     Ambulatory Referral For Screening Colonoscopy    4. FH: colon cancer in relative <50 years old  -     Ambulatory Referral For Screening Colonoscopy    5. Pain syndrome, chronic  -     traMADol (ULTRAM) 50 MG tablet; Take 1 tablet by mouth Every 6 (Six) Hours As Needed for Moderate Pain .  Dispense: 30 tablet; Refill: 0    6. Radiculopathy affecting upper extremity  -     traMADol (ULTRAM) 50 MG tablet; Take 1 tablet by mouth Every 6 (Six) Hours As Needed for Moderate Pain .  Dispense: 30 tablet; Refill: 0      With strong family history of brother with colon cancer at 48 y/o will need full colonoscopy evaluation and has been referred.  Continue the current medications.  Check the labs as ordered.  Recommend the flu shot and covid booster.  WIN run and reviewed.  Risks of the medication, tramadol, include but are not limited to fatigue, somnolence, increased risk of falls, constipation, allergic reaction, dependence, and addiction.         · COVID-19 Precautions - Patient was compliant in wearing a mask. When I saw the patient, I used appropriate personal protective equipment (PPE)  including mask and eye shield (standard procedure).  Additionally, I used gown and gloves if indicated.  Hand hygiene was completed before and after seeing the patient.  · Dictated utilizing Dragon Dictation

## 2021-09-22 LAB
ALBUMIN SERPL-MCNC: 4.9 G/DL (ref 3.5–5.2)
ALBUMIN/GLOB SERPL: 2 G/DL
ALP SERPL-CCNC: 101 U/L (ref 39–117)
ALT SERPL-CCNC: 26 U/L (ref 1–41)
AST SERPL-CCNC: 20 U/L (ref 1–40)
BILIRUB SERPL-MCNC: 0.4 MG/DL (ref 0–1.2)
BUN SERPL-MCNC: 13 MG/DL (ref 6–20)
BUN/CREAT SERPL: 12.9 (ref 7–25)
CALCIUM SERPL-MCNC: 9.8 MG/DL (ref 8.6–10.5)
CHLORIDE SERPL-SCNC: 99 MMOL/L (ref 98–107)
CHOLEST SERPL-MCNC: 145 MG/DL (ref 0–200)
CO2 SERPL-SCNC: 29.7 MMOL/L (ref 22–29)
CREAT SERPL-MCNC: 1.01 MG/DL (ref 0.76–1.27)
FERRITIN SERPL-MCNC: 671 NG/ML (ref 30–400)
GLOBULIN SER CALC-MCNC: 2.4 GM/DL
GLUCOSE SERPL-MCNC: 90 MG/DL (ref 65–99)
HDLC SERPL-MCNC: 40 MG/DL (ref 40–60)
LDLC SERPL CALC-MCNC: 74 MG/DL (ref 0–100)
POTASSIUM SERPL-SCNC: 4.5 MMOL/L (ref 3.5–5.2)
PROT SERPL-MCNC: 7.3 G/DL (ref 6–8.5)
SODIUM SERPL-SCNC: 139 MMOL/L (ref 136–145)
TRIGL SERPL-MCNC: 186 MG/DL (ref 0–150)
VLDLC SERPL CALC-MCNC: 31 MG/DL (ref 5–40)

## 2021-09-30 ENCOUNTER — TELEPHONE (OUTPATIENT)
Dept: GASTROENTEROLOGY | Facility: CLINIC | Age: 41
End: 2021-09-30

## 2021-10-05 ENCOUNTER — PREP FOR SURGERY (OUTPATIENT)
Dept: OTHER | Facility: HOSPITAL | Age: 41
End: 2021-10-05

## 2021-10-05 DIAGNOSIS — Z80.0 FH: COLON CANCER: Primary | ICD-10-CM

## 2021-10-11 ENCOUNTER — OFFICE VISIT (OUTPATIENT)
Dept: FAMILY MEDICINE CLINIC | Facility: CLINIC | Age: 41
End: 2021-10-11

## 2021-10-11 DIAGNOSIS — J06.9 UPPER RESPIRATORY TRACT INFECTION, UNSPECIFIED TYPE: Primary | ICD-10-CM

## 2021-10-11 DIAGNOSIS — Z20.822 CLOSE EXPOSURE TO COVID-19 VIRUS: ICD-10-CM

## 2021-10-11 PROCEDURE — 99442 PR PHYS/QHP TELEPHONE EVALUATION 11-20 MIN: CPT | Performed by: INTERNAL MEDICINE

## 2021-10-11 NOTE — PROGRESS NOTES
Francis Nayak is a 41 y.o. male.     Chief Complaint   Patient presents with   • COVID-19 symptoms and exposure       History of Present Illness   You have chosen to receive care through a telephone visit. Do you consent to use a telephone visit for your medical care today? Yes  Patient visit completed utilizing telephone visit.  Patient is at home removal.  Physician is in office of [facility].  Exposed to parents in home who have positive COVID_19 testing.  Began 10/7/21 with diarrhea, cough, headache, clear runny nose with congestion, chills, fatigue  Denies fever, short of breath, loss of smell or taste, N, V, abdomen pain, CP or wheezing.  The following portions of the patient's history were reviewed and updated as appropriate: allergies, current medications, past family history, past medical history, past social history, past surgical history and problem list.    Depression Screen:  No flowsheet data found.    Past Medical History:   Diagnosis Date   • Allergic 1991    Penicillin -morphine-tramadol   • Alopecia    • Asthma 1988   • BMI 25.0-25.9,adult    • Cancer (HCC) Meedulloblastoma-thyroid-basal cell   • Cataract 2004    Unsuccessful due to radiation   • Elevated ferritin    • Encounter for immunization    • Esophageal reflux    • History of long-term treatment with high-risk medication    • Hives    • Hyperlipidemia    • Hypothyroidism    • Left-sided chest wall pain    • Low back pain 1995    Due to radiation of spine   • Neuromuscular disorder (HCC) 2012    Due to radiation   • Pain syndrome, chronic    • Pituitary adenoma (HCC)    • Radiation retinopathy    • Rosacea    • Scoliosis    • Seasonal allergies    • Seizures (HCC) 1994    Due to brain cancer treatment   • Sensorineural hearing loss (SNHL), bilateral    • Vision loss, left eye        Past Surgical History:   Procedure Laterality Date   • BRAIN SURGERY  1994    Medulloblastoma   • CATARACT EXTRACTION  2004   • EYE  SURGERY      results Strabismus   • MOHS SURGERY Right 2021    ear   • SINUS SURGERY     • THYROID SURGERY     • TOTAL THYROIDECTOMY  10/02/2013    benign folicular neoplasms       Family History   Problem Relation Age of Onset   • Lymphoma Father    • Cancer Father         Diffuse large B cell lymphoma   • COPD Father    • Diabetes Father    • Heart disease Father         Due to cancer treatment   • Hyperlipidemia Father    • Kidney disease Father         Stones-1/3 kidney removed for benign tumors   • Lymphoma Maternal Grandmother    • Heart disease Maternal Grandmother         acute MI   • Coronary artery disease Maternal Grandmother    • Arthritis Maternal Grandmother    • COPD Maternal Grandmother    • Stroke Maternal Grandmother    • Thyroid disease Maternal Grandmother    • Other Maternal Grandmother         Addisons disease   • Alcohol abuse Maternal Grandfather    • Heart disease Maternal Grandfather         First heart attack age 54/ age 62 aneurysm of aorta   • Hyperlipidemia Maternal Grandfather    • Anxiety disorder Brother         After wife  of COVID-19   • Asthma Brother    • Anxiety disorder Brother         After death of sister-in-law; diagnosis of cancer of himself and his wife within two weeks   • Cancer Brother         3b colon cancer   • Anxiety disorder Mother         After waking up during surgery   • Arthritis Mother    • Hearing loss Mother    • Hyperlipidemia Mother    • Stroke Mother         TIA eye   • Cancer Paternal Grandfather         CLL leukemia   • Kidney disease Paternal Grandmother         Stones       Social History     Socioeconomic History   • Marital status: Single   Tobacco Use   • Smoking status: Never Smoker   • Smokeless tobacco: Never Used   Substance and Sexual Activity   • Alcohol use: Yes     Alcohol/week: 4.0 standard drinks     Types: 4 Cans of beer per week   • Drug use: No   • Sexual activity: Not Currently     Partners: Female     Birth  control/protection: Condom       Current Outpatient Medications   Medication Sig Dispense Refill   • Cholecalciferol (VITAMIN D3) 125 MCG (5000 UT) capsule capsule Take 5,000 Units by mouth Daily.     • desonide (DESOWEN) 0.05 % lotion APPLY TWICE DAILY AS NEEDED TO ECZEMA     • levothyroxine (SYNTHROID, LEVOTHROID) 150 MCG tablet Take 150 mcg by mouth Daily.     • mometasone (ELOCON) 0.1 % cream Apply  topically to the appropriate area as directed Daily.     • omeprazole (priLOSEC) 40 MG capsule Take 20 mg by mouth Daily.     • pravastatin (PRAVACHOL) 80 MG tablet Take 80 mg by mouth Daily.     • traMADol (ULTRAM) 50 MG tablet Take 1 tablet by mouth Every 6 (Six) Hours As Needed for Moderate Pain . 30 tablet 0   • Triamcinolone Acetonide (NASACORT ALLERGY 24HR) 55 MCG/ACT nasal inhaler 2 sprays into the nostril(s) as directed by provider Daily.       No current facility-administered medications for this visit.       Review of Systems   Constitutional: Positive for chills and fatigue. Negative for activity change, appetite change, fever, unexpected weight gain and unexpected weight loss.   HENT: Positive for congestion, rhinorrhea and sinus pressure. Negative for nosebleeds, trouble swallowing and voice change.    Eyes: Negative for visual disturbance.   Respiratory: Positive for cough. Negative for chest tightness, shortness of breath and wheezing.    Cardiovascular: Negative for chest pain, palpitations and leg swelling.   Gastrointestinal: Positive for diarrhea. Negative for abdominal pain, blood in stool, constipation, nausea, vomiting, GERD and indigestion.   Genitourinary: Negative for dysuria, frequency and hematuria.   Musculoskeletal: Negative for arthralgias, back pain and myalgias.   Skin: Negative for rash and bruise.   Neurological: Positive for headache. Negative for dizziness, tremors, weakness, light-headedness, numbness and memory problem.   Hematological: Negative for adenopathy. Does not  bruise/bleed easily.   Psychiatric/Behavioral: Negative for sleep disturbance and depressed mood. The patient is not nervous/anxious.        Objective   There were no vitals taken for this visit.    Physical Exam   Constitutional: No distress.   Pulmonary/Chest: Effort normal.   Neurological: He is alert.   Psychiatric: He has a normal mood and affect. His behavior is normal. Thought content is normal. He does not express abnormal judgement.       Recent Results (from the past 2016 hour(s))   Comprehensive Metabolic Panel    Collection Time: 09/21/21 10:36 AM    Specimen: Blood   Result Value Ref Range    Glucose 90 65 - 99 mg/dL    BUN 13 6 - 20 mg/dL    Creatinine 1.01 0.76 - 1.27 mg/dL    eGFR Non African Am 81 >60 mL/min/1.73    eGFR African Am 98 >60 mL/min/1.73    BUN/Creatinine Ratio 12.9 7.0 - 25.0    Sodium 139 136 - 145 mmol/L    Potassium 4.5 3.5 - 5.2 mmol/L    Chloride 99 98 - 107 mmol/L    Total CO2 29.7 (H) 22.0 - 29.0 mmol/L    Calcium 9.8 8.6 - 10.5 mg/dL    Total Protein 7.3 6.0 - 8.5 g/dL    Albumin 4.90 3.50 - 5.20 g/dL    Globulin 2.4 gm/dL    A/G Ratio 2.0 g/dL    Total Bilirubin 0.4 0.0 - 1.2 mg/dL    Alkaline Phosphatase 101 39 - 117 U/L    AST (SGOT) 20 1 - 40 U/L    ALT (SGPT) 26 1 - 41 U/L   Lipid Panel    Collection Time: 09/21/21 10:36 AM    Specimen: Blood   Result Value Ref Range    Total Cholesterol 145 0 - 200 mg/dL    Triglycerides 186 (H) 0 - 150 mg/dL    HDL Cholesterol 40 40 - 60 mg/dL    VLDL Cholesterol David 31 5 - 40 mg/dL    LDL Chol Calc (NIH) 74 0 - 100 mg/dL   Ferritin    Collection Time: 09/21/21 10:36 AM    Specimen: Blood   Result Value Ref Range    Ferritin 671.00 (H) 30.00 - 400.00 ng/mL     Assessment/Plan   Diagnoses and all orders for this visit:    1. Upper respiratory tract infection, unspecified type (Primary)  -     COVID-19,[facility] ROUTINE, NP/OP SWAB IN TRANSPORT MEDIA OR ESWAB 72 HR TAT - Swab, Anterior nasal; Future  -     QUESTIONNAIRE SERIES  -      COVID-19,[facility] ROUTINE, NP/OP SWAB IN TRANSPORT MEDIA OR ESWAB 72 HR TAT - Swab, Anterior nasal    2. Close exposure to COVID-19 virus  -     COVID-19,[facility] ROUTINE, NP/OP SWAB IN TRANSPORT MEDIA OR ESWAB 72 HR TAT - Swab, Anterior nasal; Future  -     QUESTIONNAIRE SERIES  -     COVID-19,[facility] ROUTINE, NP/OP SWAB IN TRANSPORT MEDIA OR ESWAB 72 HR TAT - Swab, Anterior nasal    Positive exposure to Covid with symptoms.  We will check the COVID-19 test today.  Symptomatic treatment.  At this time he really does not qualify for monoclonal antibodies but will monitor him closely.  Questionnaire series was initiated.      Telephone visit completed.    I spent 13 minutes caring for Sami on this date of service. This time includes time spent by me in the following activities:preparing for the visit, obtaining and/or reviewing a separately obtained history, counseling and educating the patient/family/caregiver, ordering medications, tests, or procedures and documenting information in the medical record

## 2021-10-12 LAB
LABCORP SARS-COV-2, NAA 2 DAY TAT: NORMAL
SARS-COV-2 RNA RESP QL NAA+PROBE: DETECTED

## 2022-01-05 ENCOUNTER — TRANSCRIBE ORDERS (OUTPATIENT)
Dept: ADMINISTRATIVE | Facility: HOSPITAL | Age: 42
End: 2022-01-05

## 2022-01-05 DIAGNOSIS — Z01.818 OTHER SPECIFIED PRE-OPERATIVE EXAMINATION: Primary | ICD-10-CM

## 2022-01-07 ENCOUNTER — LAB (OUTPATIENT)
Dept: LAB | Facility: HOSPITAL | Age: 42
End: 2022-01-07

## 2022-01-07 DIAGNOSIS — Z01.818 OTHER SPECIFIED PRE-OPERATIVE EXAMINATION: ICD-10-CM

## 2022-01-07 LAB — SARS-COV-2 ORF1AB RESP QL NAA+PROBE: NOT DETECTED

## 2022-01-07 PROCEDURE — C9803 HOPD COVID-19 SPEC COLLECT: HCPCS

## 2022-01-07 PROCEDURE — U0004 COV-19 TEST NON-CDC HGH THRU: HCPCS

## 2022-01-10 ENCOUNTER — HOSPITAL ENCOUNTER (OUTPATIENT)
Facility: HOSPITAL | Age: 42
Setting detail: HOSPITAL OUTPATIENT SURGERY
Discharge: HOME | End: 2022-01-10
Attending: INTERNAL MEDICINE | Admitting: INTERNAL MEDICINE

## 2022-01-10 ENCOUNTER — ANESTHESIA EVENT (OUTPATIENT)
Dept: GASTROENTEROLOGY | Facility: HOSPITAL | Age: 42
End: 2022-01-10

## 2022-01-10 ENCOUNTER — ANESTHESIA (OUTPATIENT)
Dept: GASTROENTEROLOGY | Facility: HOSPITAL | Age: 42
End: 2022-01-10

## 2022-01-10 VITALS
BODY MASS INDEX: 24.98 KG/M2 | HEART RATE: 56 BPM | WEIGHT: 174.5 LBS | SYSTOLIC BLOOD PRESSURE: 116 MMHG | HEIGHT: 70 IN | DIASTOLIC BLOOD PRESSURE: 71 MMHG | OXYGEN SATURATION: 100 % | RESPIRATION RATE: 16 BRPM

## 2022-01-10 DIAGNOSIS — Z80.0 FH: COLON CANCER: ICD-10-CM

## 2022-01-10 PROCEDURE — 45385 COLONOSCOPY W/LESION REMOVAL: CPT | Performed by: INTERNAL MEDICINE

## 2022-01-10 PROCEDURE — 88305 TISSUE EXAM BY PATHOLOGIST: CPT | Performed by: INTERNAL MEDICINE

## 2022-01-10 PROCEDURE — 25010000002 PROPOFOL 10 MG/ML EMULSION: Performed by: ANESTHESIOLOGY

## 2022-01-10 RX ORDER — SODIUM CHLORIDE, SODIUM LACTATE, POTASSIUM CHLORIDE, CALCIUM CHLORIDE 600; 310; 30; 20 MG/100ML; MG/100ML; MG/100ML; MG/100ML
1000 INJECTION, SOLUTION INTRAVENOUS CONTINUOUS
Status: DISCONTINUED | OUTPATIENT
Start: 2022-01-10 | End: 2022-01-10 | Stop reason: HOSPADM

## 2022-01-10 RX ORDER — LIDOCAINE HYDROCHLORIDE 20 MG/ML
INJECTION, SOLUTION INFILTRATION; PERINEURAL AS NEEDED
Status: DISCONTINUED | OUTPATIENT
Start: 2022-01-10 | End: 2022-01-10 | Stop reason: SURG

## 2022-01-10 RX ORDER — PROPOFOL 10 MG/ML
VIAL (ML) INTRAVENOUS AS NEEDED
Status: DISCONTINUED | OUTPATIENT
Start: 2022-01-10 | End: 2022-01-10 | Stop reason: SURG

## 2022-01-10 RX ORDER — SODIUM CHLORIDE 0.9 % (FLUSH) 0.9 %
10 SYRINGE (ML) INJECTION AS NEEDED
Status: DISCONTINUED | OUTPATIENT
Start: 2022-01-10 | End: 2022-01-10 | Stop reason: HOSPADM

## 2022-01-10 RX ADMIN — SODIUM CHLORIDE, POTASSIUM CHLORIDE, SODIUM LACTATE AND CALCIUM CHLORIDE 1000 ML: 600; 310; 30; 20 INJECTION, SOLUTION INTRAVENOUS at 15:45

## 2022-01-10 RX ADMIN — LIDOCAINE HYDROCHLORIDE 60 MG: 20 INJECTION, SOLUTION INFILTRATION; PERINEURAL at 15:52

## 2022-01-10 RX ADMIN — PROPOFOL 100 MG: 10 INJECTION, EMULSION INTRAVENOUS at 15:52

## 2022-01-10 RX ADMIN — PROPOFOL 140 MCG/KG/MIN: 10 INJECTION, EMULSION INTRAVENOUS at 15:52

## 2022-01-10 NOTE — DISCHARGE INSTRUCTIONS
For the next 24 hours patient needs to be with a responsible adult.    For 24 hours DO NOT drive, operate machinery, appliances, drink alcohol, make important decisions or sign legal documents.    Start with a light or bland diet if you are feeling sick to your stomach otherwise advance to regular diet as tolerated.    Follow recommendations on procedure report if provided by your doctor.    Call Dr Cole for problems [phone]    Problems may include but not limited to: large amounts of bleeding, trouble breathing, repeated vomiting, severe unrelieved pain, fever or chills.

## 2022-01-10 NOTE — ANESTHESIA PREPROCEDURE EVALUATION
Anesthesia Evaluation     Patient summary reviewed and Nursing notes reviewed   NPO Solid Status: > 8 hours  NPO Liquid Status: > 2 hours           Airway   Mallampati: II  TM distance: >3 FB  Neck ROM: full  No difficulty expected  Dental - normal exam     Pulmonary - normal exam   (+) asthma,  Cardiovascular - negative cardio ROS and normal exam  Exercise tolerance: good (4-7 METS)        Neuro/Psych- negative ROS  (-) seizures, numbness  GI/Hepatic/Renal/Endo    (+)  GERD,  thyroid problem hypothyroidism    Musculoskeletal     (+) chronic pain,   Abdominal  - normal exam    Bowel sounds: normal.   Substance History - negative use     OB/GYN negative ob/gyn ROS         Other      history of cancer remission    ROS/Med Hx Other: H/O brain tumor-s/p resection. No recent h/o seizures. Well controlled.                 Anesthesia Plan    ASA 3     MAC       Anesthetic plan, all risks, benefits, and alternatives have been provided, discussed and informed consent has been obtained with: patient.    Plan discussed with CRNA.

## 2022-01-10 NOTE — ANESTHESIA POSTPROCEDURE EVALUATION
"Patient: Sami Nayak    Procedure Summary     Date: 01/10/22 Room / Location:  [facility]    Anesthesia Start: 1547 Anesthesia Stop: 1621    Procedure: COLONOSCOPY TO CECUM AND TERMINAL ILEUM WITH COLD POLYPECTOMIES (N/A ) Diagnosis:       FH: colon cancer      (FH: colon cancer [Z80.0])    Surgeons: Harjeet Cole MD Provider: Jose Armando Moreno MD    Anesthesia Type: MAC ASA Status: 3          Anesthesia Type: MAC    Vitals  Vitals Value Taken Time   /73 01/10/22 1619   Temp     Pulse 69 01/10/22 1619   Resp 20 01/10/22 1619   SpO2 100 % 01/10/22 1619           Post Anesthesia Care and Evaluation    Patient location during evaluation: bedside  Patient participation: complete - patient participated  Level of consciousness: awake and alert  Pain management: adequate  Airway patency: patent  Anesthetic complications: No anesthetic complications  PONV Status: none  Cardiovascular status: acceptable and hemodynamically stable  Respiratory status: acceptable and spontaneous ventilation  Hydration status: acceptable    Comments: /73 (BP Location: Left arm, Patient Position: Sitting)   Pulse 69   Resp 20   Ht 177.8 cm (70\")   Wt 79.2 kg (174 lb 8 oz)   SpO2 100%   BMI 25.04 kg/m²         "

## 2022-01-11 LAB
LAB AP CASE REPORT: NORMAL
PATH REPORT.FINAL DX SPEC: NORMAL
PATH REPORT.GROSS SPEC: NORMAL

## 2022-01-24 ENCOUNTER — TELEPHONE (OUTPATIENT)
Dept: GASTROENTEROLOGY | Facility: CLINIC | Age: 42
End: 2022-01-24

## 2022-01-24 NOTE — TELEPHONE ENCOUNTER
Call to pt.  Advise per Dr Cole note. Verb understanding.     C/s for 1/10/24 placed in recall and HM.     Update to DR Jose Armando Ramirez.

## 2022-01-24 NOTE — TELEPHONE ENCOUNTER
----- Message from Harjeet Cole MD sent at 1/24/2022  6:52 AM EST -----  01/24/22       Tell him that the colon polyps that were removed were not cancerous but at least 1 was precancerous.  I recommend that he have a repeat colonoscopy in 2 to 3 years because of his personal history of colon polyps and the fact that his brother was just diagnosed with colon cancer.  Please send a copy of this report to his PCP.  Thx. kjh

## 2022-01-24 NOTE — PROGRESS NOTES
01/24/22       Tell him that the colon polyps that were removed were not cancerous but at least 1 was precancerous.  I recommend that he have a repeat colonoscopy in 2 to 3 years because of his personal history of colon polyps and the fact that his brother was just diagnosed with colon cancer.  Please send a copy of this report to his PCP.  Randal. kjh

## 2022-03-22 ENCOUNTER — OFFICE VISIT (OUTPATIENT)
Dept: FAMILY MEDICINE CLINIC | Facility: CLINIC | Age: 42
End: 2022-03-22

## 2022-03-22 VITALS
DIASTOLIC BLOOD PRESSURE: 70 MMHG | OXYGEN SATURATION: 100 % | WEIGHT: 178.6 LBS | BODY MASS INDEX: 25.63 KG/M2 | TEMPERATURE: 97.3 F | HEART RATE: 84 BPM | SYSTOLIC BLOOD PRESSURE: 106 MMHG

## 2022-03-22 DIAGNOSIS — G89.4 PAIN SYNDROME, CHRONIC: ICD-10-CM

## 2022-03-22 DIAGNOSIS — R79.89 ELEVATED FERRITIN: ICD-10-CM

## 2022-03-22 DIAGNOSIS — Z00.00 ANNUAL PHYSICAL EXAM: ICD-10-CM

## 2022-03-22 DIAGNOSIS — M54.10 RADICULOPATHY AFFECTING UPPER EXTREMITY: ICD-10-CM

## 2022-03-22 DIAGNOSIS — E78.5 HYPERLIPIDEMIA, UNSPECIFIED HYPERLIPIDEMIA TYPE: Primary | ICD-10-CM

## 2022-03-22 PROCEDURE — 99214 OFFICE O/P EST MOD 30 MIN: CPT | Performed by: INTERNAL MEDICINE

## 2022-03-22 RX ORDER — TRAMADOL HYDROCHLORIDE 50 MG/1
50 TABLET ORAL EVERY 6 HOURS PRN
Qty: 30 TABLET | Refills: 0 | Status: SHIPPED | OUTPATIENT
Start: 2022-03-22 | End: 2022-09-21 | Stop reason: SDUPTHER

## 2022-03-22 NOTE — PROGRESS NOTES
Chief Complaint   Patient presents with   • Annual Exam   • Med Refill       HPI:  Sami Nayak, -1980, is a 41 y.o. male who presents for an annual physical.    Recent Hospitalizations:  No hospitalization(s) within the last year..    Current Medical Providers:  Patient Care Team:  Jose Armando Ramirez MD as PCP - General (Internal Medicine)  Poonam Bryant (Endocrinology)  Rickey Mendoza MD as Consulting Physician (Otolaryngology)  Charbel Foote MD (Dermatology)  Candy White MD as Consulting Physician (Dermatology)  Harjeet Cole MD as Consulting Physician (Gastroenterology)  Shaun Castillo MD as Consulting Physician (Ophthalmology)    Compared to one year ago, the patient feels his physical health is the same and his mental health is the same.    Depression Screen:  No flowsheet data found.    Past Medical/Family/Social History:  The following portions of the patient's history were reviewed and updated as appropriate: allergies, current medications, past family history, past medical history, past social history, past surgical history and problem list.    Allergies   Allergen Reactions   • Penicillins Anaphylaxis     Cerner Allergy Text Annotation: penicillins   • Latex Hives   • Morphine Hives     Cerner Allergy Text Annotation: morphine         Current Outpatient Medications:   •  Cholecalciferol (VITAMIN D3) 125 MCG (5000 UT) capsule capsule, Take 5,000 Units by mouth Daily., Disp: , Rfl:   •  desonide (DESOWEN) 0.05 % lotion, APPLY TWICE DAILY AS NEEDED TO ECZEMA, Disp: , Rfl:   •  levothyroxine (SYNTHROID, LEVOTHROID) 150 MCG tablet, Take 150 mcg by mouth Daily., Disp: , Rfl:   •  mometasone (ELOCON) 0.1 % cream, Apply  topically to the appropriate area as directed Daily., Disp: , Rfl:   •  omeprazole (priLOSEC) 40 MG capsule, Take 20 mg by mouth Daily., Disp: , Rfl:   •  pravastatin (PRAVACHOL) 80 MG tablet, Take 80 mg by mouth Daily., Disp: , Rfl:   •  traMADol (ULTRAM) 50 MG tablet,  Take 1 tablet by mouth Every 6 (Six) Hours As Needed for Moderate Pain ., Disp: 30 tablet, Rfl: 0  •  Triamcinolone Acetonide (NASACORT) 55 MCG/ACT nasal inhaler, 2 sprays into the nostril(s) as directed by provider Daily., Disp: , Rfl:     Current medication list contains no high risk medications.  No harmful drug interactions have been identified.     Family History   Problem Relation Age of Onset   • Lymphoma Father    • Cancer Father         Diffuse large B cell lymphoma   • COPD Father    • Diabetes Father    • Heart disease Father         Due to cancer treatment   • Hyperlipidemia Father    • Kidney disease Father         Stones-1/3 kidney removed for benign tumors   • Lymphoma Maternal Grandmother    • Heart disease Maternal Grandmother         acute MI   • Coronary artery disease Maternal Grandmother    • Arthritis Maternal Grandmother    • COPD Maternal Grandmother    • Stroke Maternal Grandmother    • Thyroid disease Maternal Grandmother    • Other Maternal Grandmother         Addisons disease   • Alcohol abuse Maternal Grandfather    • Heart disease Maternal Grandfather         First heart attack age 54/ age 64 aneurysm of aorta   • Hyperlipidemia Maternal Grandfather    • Anxiety disorder Brother         After wife  of COVID-19   • Asthma Brother    • Anxiety disorder Brother         After death of sister-in-law; diagnosis of cancer of himself and his wife within two weeks   • Cancer Brother         3b colon cancer   • Anxiety disorder Mother         After waking up during surgery   • Arthritis Mother    • Hearing loss Mother    • Hyperlipidemia Mother    • Stroke Mother         TIA eye   • Cancer Paternal Grandfather         CLL leukemia   • Kidney disease Paternal Grandmother         Stones   • Malig Hyperthermia Neg Hx        Social History     Tobacco Use   • Smoking status: Never Smoker   • Smokeless tobacco: Never Used   Substance Use Topics   • Alcohol use: Yes     Alcohol/week: 4.0  standard drinks     Types: 4 Cans of beer per week     Comment: socially       Past Surgical History:   Procedure Laterality Date   • BRAIN SURGERY  1994    Medulloblastoma   • CATARACT EXTRACTION  2004   • COLONOSCOPY N/A 1/10/2022    Procedure: COLONOSCOPY TO CECUM AND TERMINAL ILEUM WITH COLD POLYPECTOMIES;  Surgeon: Harjeet Cole MD;  [facility]: [facility] END[facility];  Service: Gastroenterology;  Laterality: N/A;  FAMILY HISTORY OF COLON CANCER  COLON POLYPS, INTERNAL HEMORRHOIDS   • EYE SURGERY  1994    results Strabismus   • MOHS SURGERY Right 02/23/2021    ear   • SINUS SURGERY  1990   • THYROID SURGERY     • TOTAL THYROIDECTOMY  10/02/2013    benign folicular neoplasms       Patient Active Problem List   Diagnosis   • Scoliosis   • Radiation retinopathy   • Hypothyroidism   • Hyperlipidemia   • Esophageal reflux   • Elevated ferritin   • Alopecia   • Multinodular thyroid   • Pain syndrome, chronic   • Radiculopathy affecting upper extremity   • Elevated TSH   • Myalgia   • Chronic fatigue   • Vitamin D deficiency   • Male hypogonadism   • Annual physical exam   • Presence of intraocular lens   • Unspecified astigmatism, bilateral   • Bilateral myopia   • Bilateral sensorineural hearing loss   • Rosacea   • FH: colon cancer in relative <50 years old       Review of Systems   Constitutional: Negative for activity change, appetite change, fatigue, fever, unexpected weight gain and unexpected weight loss.   HENT: Negative for nosebleeds, rhinorrhea, trouble swallowing and voice change.    Eyes: Negative for visual disturbance.   Respiratory: Negative for cough, chest tightness, shortness of breath and wheezing.    Cardiovascular: Negative for chest pain, palpitations and leg swelling.   Gastrointestinal: Negative for abdominal pain, blood in stool, constipation, diarrhea, nausea, vomiting, GERD and indigestion.   Genitourinary: Negative for dysuria, frequency and hematuria.   Musculoskeletal: Negative for arthralgias,  back pain and myalgias.   Skin: Negative for rash and wound.   Neurological: Negative for dizziness, tremors, weakness, light-headedness, numbness, headache and memory problem.   Hematological: Negative for adenopathy. Does not bruise/bleed easily.   Psychiatric/Behavioral: Negative for sleep disturbance and depressed mood. The patient is not nervous/anxious.        Objective     Vitals:    03/22/22 1307   BP: 106/70   BP Location: Right arm   Patient Position: Sitting   Cuff Size: Adult   Pulse: 84   Temp: 97.3 °F (36.3 °C)   TempSrc: Temporal   SpO2: 100%   Weight: 81 kg (178 lb 9.6 oz)       Patient's Body mass index is 25.63 kg/m². indicating that he is overweight (BMI 25-29.9). Patient's (Body mass index is 25.63 kg/m².) indicates that they are overweight with health conditions that include dyslipidemias and GERD . Weight is unchanged. BMI is is above average; BMI management plan is completed. We discussed portion control and increasing exercise. .      No exam data present    Physical Exam  Vitals and nursing note reviewed.   Constitutional:       General: He is not in acute distress.     Appearance: He is well-developed. He is not diaphoretic.   HENT:      Head: Normocephalic and atraumatic.      Right Ear: External ear normal.      Left Ear: External ear normal.      Nose: Nose normal.   Eyes:      Conjunctiva/sclera: Conjunctivae normal.      Pupils: Pupils are equal, round, and reactive to light.   Neck:      Thyroid: No thyromegaly.      Trachea: No tracheal deviation.   Cardiovascular:      Rate and Rhythm: Normal rate and regular rhythm.      Heart sounds: Normal heart sounds. No murmur heard.    No friction rub. No gallop.   Pulmonary:      Effort: Pulmonary effort is normal. No respiratory distress.      Breath sounds: Normal breath sounds.   Abdominal:      General: Bowel sounds are normal.      Palpations: Abdomen is soft. There is no mass.      Tenderness: There is no abdominal tenderness. There is  no guarding.   Musculoskeletal:         General: Normal range of motion.      Cervical back: Normal range of motion and neck supple.   Lymphadenopathy:      Cervical: No cervical adenopathy.   Skin:     General: Skin is warm and dry.      Capillary Refill: Capillary refill takes less than 2 seconds.      Findings: No rash.   Neurological:      Mental Status: He is alert and oriented to person, place, and time.      Motor: No abnormal muscle tone.      Deep Tendon Reflexes: Reflexes normal.   Psychiatric:         Behavior: Behavior normal.         Thought Content: Thought content normal.         Judgment: Judgment normal.         Recent Lab Results:     Lab Results   Component Value Date    TRIG 186 (H) 09/21/2021    HDL 40 09/21/2021    VLDL 31 09/21/2021       Assessment/Plan   Age-appropriate Screening Schedule:  Refer to the list below for future screening recommendations based on patient's age, sex and/or medical conditions.      Health Maintenance   Topic Date Due   • TDAP/TD VACCINES (2 - Td or Tdap) 08/01/2022   • LIPID PANEL  09/21/2022   • INFLUENZA VACCINE  Completed       Diagnoses and all orders for this visit:    1. Hyperlipidemia, unspecified hyperlipidemia type (Primary)  -     Comprehensive Metabolic Panel  -     Lipid Panel    2. Pain syndrome, chronic  -     traMADol (ULTRAM) 50 MG tablet; Take 1 tablet by mouth Every 6 (Six) Hours As Needed for Moderate Pain .  Dispense: 30 tablet; Refill: 0    3. Radiculopathy affecting upper extremity  -     traMADol (ULTRAM) 50 MG tablet; Take 1 tablet by mouth Every 6 (Six) Hours As Needed for Moderate Pain .  Dispense: 30 tablet; Refill: 0    4. Annual physical exam  -     Comprehensive Metabolic Panel  -     Lipid Panel    5. Elevated ferritin  -     Ferritin    Annual wellness visit reviewed with patient.  All past history, medications, social history, and problem list were reviewed.  Discussed advanced directives and living will.  Patient has living will:  Living will: Directive already scanned in chart.  Will check the labs as ordered above to evaluate the blood sugars, kidney, liver, cholesterol for screening.  Discussed flu shot recommended to get the influenza vaccine annually in the fall.  Shingrix and pneumonia vaccination series discussed.  Encouraged follow-up with the eye doctor on annual basis.  Discussed weight and encouraged exercise as tolerated while following a healthy diet.  Reviewed sexual health and safe sex practices.  Follow up with current specialists as needed.   WIN run and reviewed.  Risks of the medication include but are not limited to fatigue, somnolence, increased risk of falls, constipation, allergic reaction, dependence, and addiction    An After Visit Summary with all of these plans were given to the patient.        Follow Up:  No follow-ups on file.

## 2022-03-26 LAB
ALBUMIN SERPL-MCNC: 5.1 G/DL (ref 4–5)
ALBUMIN/GLOB SERPL: 1.6 {RATIO} (ref 1.2–2.2)
ALP SERPL-CCNC: 92 IU/L (ref 44–121)
ALT SERPL-CCNC: 35 IU/L (ref 0–44)
AST SERPL-CCNC: 26 IU/L (ref 0–40)
BILIRUB SERPL-MCNC: 0.6 MG/DL (ref 0–1.2)
BUN SERPL-MCNC: 14 MG/DL (ref 6–24)
BUN/CREAT SERPL: 12 (ref 9–20)
CALCIUM SERPL-MCNC: 10.6 MG/DL (ref 8.7–10.2)
CHLORIDE SERPL-SCNC: 101 MMOL/L (ref 96–106)
CHOLEST SERPL-MCNC: 185 MG/DL (ref 100–199)
CO2 SERPL-SCNC: 24 MMOL/L (ref 20–29)
CREAT SERPL-MCNC: 1.17 MG/DL (ref 0.76–1.27)
EGFRCR SERPLBLD CKD-EPI 2021: 79 ML/MIN/1.73
FERRITIN SERPL-MCNC: 696 NG/ML (ref 30–400)
GLOBULIN SER CALC-MCNC: 3.2 G/DL (ref 1.5–4.5)
GLUCOSE SERPL-MCNC: 92 MG/DL (ref 65–99)
HDLC SERPL-MCNC: 47 MG/DL
LDLC SERPL CALC-MCNC: 113 MG/DL (ref 0–99)
POTASSIUM SERPL-SCNC: 5.4 MMOL/L (ref 3.5–5.2)
PROT SERPL-MCNC: 8.3 G/DL (ref 6–8.5)
SODIUM SERPL-SCNC: 143 MMOL/L (ref 134–144)
TRIGL SERPL-MCNC: 139 MG/DL (ref 0–149)
VLDLC SERPL CALC-MCNC: 25 MG/DL (ref 5–40)

## 2022-06-28 DIAGNOSIS — E83.52 HYPERCALCEMIA: ICD-10-CM

## 2022-06-28 DIAGNOSIS — E87.5 HYPERKALEMIA: ICD-10-CM

## 2022-06-28 DIAGNOSIS — E55.9 VITAMIN D DEFICIENCY: ICD-10-CM

## 2022-06-28 DIAGNOSIS — E87.5 HYPERKALEMIA: Primary | ICD-10-CM

## 2022-06-29 DIAGNOSIS — E87.5 HYPERKALEMIA: Primary | ICD-10-CM

## 2022-06-29 LAB
25(OH)D3+25(OH)D2 SERPL-MCNC: 86.7 NG/ML (ref 30–100)
BUN SERPL-MCNC: 14 MG/DL (ref 6–24)
BUN/CREAT SERPL: 14 (ref 9–20)
CALCIUM SERPL-MCNC: 10.2 MG/DL (ref 8.7–10.2)
CHLORIDE SERPL-SCNC: 104 MMOL/L (ref 96–106)
CO2 SERPL-SCNC: 25 MMOL/L (ref 20–29)
CREAT SERPL-MCNC: 1 MG/DL (ref 0.76–1.27)
EGFRCR SERPLBLD CKD-EPI 2021: 95 ML/MIN/1.73
GLUCOSE SERPL-MCNC: 91 MG/DL (ref 65–99)
INTACT PTH: NORMAL
POTASSIUM SERPL-SCNC: 5.6 MMOL/L (ref 3.5–5.2)
PTH-INTACT SERPL-MCNC: 35 PG/ML (ref 15–65)
SODIUM SERPL-SCNC: 145 MMOL/L (ref 134–144)

## 2022-09-21 ENCOUNTER — OFFICE VISIT (OUTPATIENT)
Dept: FAMILY MEDICINE CLINIC | Facility: CLINIC | Age: 42
End: 2022-09-21

## 2022-09-21 VITALS
BODY MASS INDEX: 26.11 KG/M2 | DIASTOLIC BLOOD PRESSURE: 70 MMHG | OXYGEN SATURATION: 98 % | HEIGHT: 70 IN | TEMPERATURE: 98.4 F | HEART RATE: 73 BPM | SYSTOLIC BLOOD PRESSURE: 112 MMHG | WEIGHT: 182.4 LBS

## 2022-09-21 DIAGNOSIS — E78.5 HYPERLIPIDEMIA, UNSPECIFIED HYPERLIPIDEMIA TYPE: ICD-10-CM

## 2022-09-21 DIAGNOSIS — M54.10 RADICULOPATHY AFFECTING UPPER EXTREMITY: ICD-10-CM

## 2022-09-21 DIAGNOSIS — R79.89 ELEVATED FERRITIN: ICD-10-CM

## 2022-09-21 DIAGNOSIS — G89.4 PAIN SYNDROME, CHRONIC: ICD-10-CM

## 2022-09-21 DIAGNOSIS — Z00.00 ANNUAL PHYSICAL EXAM: Primary | ICD-10-CM

## 2022-09-21 PROBLEM — S04.039A: Status: ACTIVE | Noted: 2022-06-20

## 2022-09-21 PROBLEM — H35.359 CYSTOID MACULAR DEGENERATION: Status: ACTIVE | Noted: 2022-06-20

## 2022-09-21 PROCEDURE — 99396 PREV VISIT EST AGE 40-64: CPT | Performed by: INTERNAL MEDICINE

## 2022-09-21 RX ORDER — TRAMADOL HYDROCHLORIDE 50 MG/1
50 TABLET ORAL EVERY 6 HOURS PRN
Qty: 30 TABLET | Refills: 0 | Status: SHIPPED | OUTPATIENT
Start: 2022-09-21

## 2022-09-21 NOTE — PROGRESS NOTES
Chief Complaint   Patient presents with   • Annual Exam       HPI:  Sami Nayak, -1980, is a 42 y.o. male who presents for an annual physical.    Recent Hospitalizations:  No hospitalization(s) within the last year..    Current Medical Providers:  Patient Care Team:  Jose Armando Ramirez MD as PCP - General (Internal Medicine)  Poonam Bryant (Endocrinology)  Rickey Mendoza MD as Consulting Physician (Otolaryngology)  Charbel Foote MD (Dermatology)  Candy White MD as Consulting Physician (Dermatology)  Harjeet Cole MD as Consulting Physician (Gastroenterology)  Shaun Castillo MD as Consulting Physician (Ophthalmology)    Compared to one year ago, the patient feels his physical health is the same and his mental health is the same.    Depression Screen:  PHQ-2/PHQ-9 Depression Screening 2022   Little Interest or Pleasure in Doing Things 0-->not at all   Feeling Down, Depressed or Hopeless 0-->not at all   PHQ-9: Brief Depression Severity Measure Score 0       Past Medical/Family/Social History:  The following portions of the patient's history were reviewed and updated as appropriate: allergies, current medications, past family history, past medical history, past social history, past surgical history and problem list.    Allergies   Allergen Reactions   • Penicillins Anaphylaxis     Cerner Allergy Text Annotation: penicillins   • Latex Hives   • Morphine Hives     Cerner Allergy Text Annotation: morphine         Current Outpatient Medications:   •  Cholecalciferol (VITAMIN D3) 125 MCG (5000 UT) capsule capsule, Take 5,000 Units by mouth Daily., Disp: , Rfl:   •  desonide (DESOWEN) 0.05 % lotion, APPLY TWICE DAILY AS NEEDED TO ECZEMA, Disp: , Rfl:   •  levothyroxine (SYNTHROID, LEVOTHROID) 150 MCG tablet, Take 150 mcg by mouth Daily., Disp: , Rfl:   •  mometasone (ELOCON) 0.1 % cream, Apply  topically to the appropriate area as directed Daily., Disp: , Rfl:   •  omeprazole (priLOSEC) 40 MG  capsule, Take 20 mg by mouth Daily., Disp: , Rfl:   •  pravastatin (PRAVACHOL) 80 MG tablet, Take 80 mg by mouth Daily., Disp: , Rfl:   •  traMADol (ULTRAM) 50 MG tablet, Take 1 tablet by mouth Every 6 (Six) Hours As Needed for Moderate Pain., Disp: 30 tablet, Rfl: 0  •  Triamcinolone Acetonide (NASACORT) 55 MCG/ACT nasal inhaler, 2 sprays into the nostril(s) as directed by provider Daily., Disp: , Rfl:     Current medication list contains no high risk medications.  No harmful drug interactions have been identified.     Family History   Problem Relation Age of Onset   • Lymphoma Father    • Cancer Father         Diffuse large B cell lymphoma   • COPD Father    • Diabetes Father    • Heart disease Father         Due to cancer treatment   • Hyperlipidemia Father    • Kidney disease Father         Stones-1/3 kidney removed for benign tumors   • Lymphoma Maternal Grandmother    • Heart disease Maternal Grandmother         acute MI   • Coronary artery disease Maternal Grandmother    • Arthritis Maternal Grandmother    • COPD Maternal Grandmother    • Stroke Maternal Grandmother    • Thyroid disease Maternal Grandmother    • Other Maternal Grandmother         Addisons disease   • Alcohol abuse Maternal Grandfather    • Heart disease Maternal Grandfather         First heart attack age 54/ age 64 aneurysm of aorta   • Hyperlipidemia Maternal Grandfather    • Anxiety disorder Brother         After wife  of COVID-19   • Asthma Brother    • Anxiety disorder Brother         After death of sister-in-law; diagnosis of cancer of himself and his wife within two weeks   • Cancer Brother         3b colon cancer   • Anxiety disorder Mother         After waking up during surgery   • Arthritis Mother    • Hearing loss Mother    • Hyperlipidemia Mother    • Stroke Mother         TIA eye   • Cancer Paternal Grandfather         CLL leukemia   • Kidney disease Paternal Grandmother         Stones   • Malig Hyperthermia Neg Hx         Social History     Tobacco Use   • Smoking status: Never Smoker   • Smokeless tobacco: Never Used   Substance Use Topics   • Alcohol use: Yes     Alcohol/week: 4.0 standard drinks     Types: 4 Cans of beer per week     Comment: socially       Past Surgical History:   Procedure Laterality Date   • BRAIN SURGERY  1994    Medulloblastoma   • CATARACT EXTRACTION  2004   • COLONOSCOPY N/A 1/10/2022    Procedure: COLONOSCOPY TO CECUM AND TERMINAL ILEUM WITH COLD POLYPECTOMIES;  Surgeon: Harjeet Cole MD;  Location: [facility] ENDOSCOPY;  Service: Gastroenterology;  Laterality: N/A;  FAMILY HISTORY OF COLON CANCER  COLON POLYPS, INTERNAL HEMORRHOIDS   • EYE SURGERY  1994    results Strabismus   • MOHS SURGERY Right 02/23/2021    ear   • SINUS SURGERY  1990   • THYROID SURGERY     • TOTAL THYROIDECTOMY  10/02/2013    benign folicular neoplasms       Patient Active Problem List   Diagnosis   • Scoliosis   • Radiation retinopathy   • Hypothyroidism   • Hyperlipidemia   • Esophageal reflux   • Elevated ferritin   • Alopecia   • Multinodular thyroid   • Pain syndrome, chronic   • Radiculopathy affecting upper extremity   • Elevated TSH   • Myalgia   • Chronic fatigue   • Vitamin D deficiency   • Male hypogonadism   • Annual physical exam   • Presence of intraocular lens   • Unspecified astigmatism, bilateral   • Bilateral myopia   • Bilateral sensorineural hearing loss   • Rosacea   • FH: colon cancer in relative <50 years old   • Injury of optic tract and pathways, unspecified side, initial encounter   • Cystoid macular degeneration       Review of Systems   Constitutional: Negative for activity change, appetite change, fatigue, fever, unexpected weight gain and unexpected weight loss.   HENT: Negative for nosebleeds, rhinorrhea, trouble swallowing and voice change.    Eyes: Negative for visual disturbance.   Respiratory: Negative for cough, chest tightness, shortness of breath and wheezing.    Cardiovascular: Negative for  "chest pain, palpitations and leg swelling.   Gastrointestinal: Negative for abdominal pain, blood in stool, constipation, diarrhea, nausea, vomiting, GERD and indigestion.   Genitourinary: Negative for dysuria, frequency and hematuria.   Musculoskeletal: Negative for arthralgias, back pain and myalgias.   Skin: Negative for rash and wound.   Neurological: Negative for dizziness, tremors, weakness, light-headedness, numbness, headache and memory problem.   Hematological: Negative for adenopathy. Does not bruise/bleed easily.   Psychiatric/Behavioral: Negative for sleep disturbance and depressed mood. The patient is not nervous/anxious.        Objective     Vitals:    09/21/22 1305   BP: 112/70   BP Location: Right arm   Patient Position: Sitting   Pulse: 73   Temp: 98.4 °F (36.9 °C)   SpO2: 98%   Weight: 82.7 kg (182 lb 6.4 oz)   Height: 177.8 cm (70\")     BMI is >= 25 and <30. (Overweight) The following options were offered after discussion;: exercise counseling/recommendations and nutrition counseling/recommendations    Physical Exam  Vitals and nursing note reviewed.   Constitutional:       General: He is not in acute distress.     Appearance: He is well-developed. He is not diaphoretic.   HENT:      Head: Normocephalic and atraumatic.      Right Ear: External ear normal.      Left Ear: External ear normal.      Nose: Nose normal.   Eyes:      Conjunctiva/sclera: Conjunctivae normal.      Pupils: Pupils are equal, round, and reactive to light.   Neck:      Thyroid: No thyromegaly.      Trachea: No tracheal deviation.   Cardiovascular:      Rate and Rhythm: Normal rate and regular rhythm.      Heart sounds: Normal heart sounds. No murmur heard.    No friction rub. No gallop.   Pulmonary:      Effort: Pulmonary effort is normal. No respiratory distress.      Breath sounds: Normal breath sounds.   Abdominal:      General: Bowel sounds are normal.      Palpations: Abdomen is soft. There is no mass.      Tenderness: " There is no abdominal tenderness. There is no guarding.   Musculoskeletal:         General: Normal range of motion.      Cervical back: Normal range of motion and neck supple.   Lymphadenopathy:      Cervical: No cervical adenopathy.   Skin:     General: Skin is warm and dry.      Capillary Refill: Capillary refill takes less than 2 seconds.      Findings: No rash.   Neurological:      Mental Status: He is alert and oriented to person, place, and time.      Motor: No abnormal muscle tone.      Deep Tendon Reflexes: Reflexes normal.   Psychiatric:         Behavior: Behavior normal.         Thought Content: Thought content normal.         Judgment: Judgment normal.       Recent Lab Results:     Lab Results   Component Value Date    TRIG 139 03/25/2022    HDL 47 03/25/2022    VLDL 25 03/25/2022     Assessment & Plan   Age-appropriate Screening Schedule:  Refer to the list below for future screening recommendations based on patient's age, sex and/or medical conditions.      Health Maintenance   Topic Date Due   • INFLUENZA VACCINE  10/01/2022   • LIPID PANEL  03/25/2023   • TDAP/TD VACCINES (3 - Td or Tdap) 08/31/2032       Diagnoses and all orders for this visit:    1. Annual physical exam (Primary)    2. Pain syndrome, chronic  -     traMADol (ULTRAM) 50 MG tablet; Take 1 tablet by mouth Every 6 (Six) Hours As Needed for Moderate Pain.  Dispense: 30 tablet; Refill: 0  -     ToxASSURE Select 13 (MW) - Urine, Clean Catch    3. Radiculopathy affecting upper extremity  -     traMADol (ULTRAM) 50 MG tablet; Take 1 tablet by mouth Every 6 (Six) Hours As Needed for Moderate Pain.  Dispense: 30 tablet; Refill: 0    4. Hyperlipidemia, unspecified hyperlipidemia type  -     Comprehensive Metabolic Panel  -     Lipid Panel    5. Elevated ferritin  -     Ferritin    Annual wellness visit reviewed with patient.  All past history, medications, social history, and problem list were reviewed.  Discussed advanced directives and living  will.  Patient has living will: Living will: Directive already scanned in chart.  Will check the labs as ordered above to evaluate the blood sugars, kidney, liver, cholesterol for screening.  Discussed flu shot recommended to get the influenza vaccine annually in the fall.    Tdap and Pneumovax are up-to-date.  Encouraged follow-up with the eye doctor on annual basis.  Discussed weight and encouraged exercise as tolerated while following a healthy diet.  Reviewed sexual health and safe sex practices.  Follow up with current specialists as needed.     WIN run and reviewed.  Risks of the medication include but are not limited to fatigue, somnolence, increased risk of falls, constipation, allergic reaction, dependence, and addiction.  Controlled substance agreement reviewed in office and completed.     An After Visit Summary with all of these plans were given to the patient.        Follow Up:  No follow-ups on file.

## 2022-09-22 LAB
ALBUMIN SERPL-MCNC: 4.9 G/DL (ref 3.5–5.2)
ALBUMIN/GLOB SERPL: 1.8 G/DL
ALP SERPL-CCNC: 84 U/L (ref 39–117)
ALT SERPL-CCNC: 31 U/L (ref 1–41)
AST SERPL-CCNC: 22 U/L (ref 1–40)
BILIRUB SERPL-MCNC: 0.5 MG/DL (ref 0–1.2)
BUN SERPL-MCNC: 13 MG/DL (ref 6–20)
BUN/CREAT SERPL: 12.4 (ref 7–25)
CALCIUM SERPL-MCNC: 9.9 MG/DL (ref 8.6–10.5)
CHLORIDE SERPL-SCNC: 101 MMOL/L (ref 98–107)
CHOLEST SERPL-MCNC: 180 MG/DL (ref 0–200)
CO2 SERPL-SCNC: 31.9 MMOL/L (ref 22–29)
CREAT SERPL-MCNC: 1.05 MG/DL (ref 0.76–1.27)
EGFRCR SERPLBLD CKD-EPI 2021: 89.8 ML/MIN/1.73
FERRITIN SERPL-MCNC: 532 NG/ML (ref 30–400)
GLOBULIN SER CALC-MCNC: 2.7 GM/DL
GLUCOSE SERPL-MCNC: 93 MG/DL (ref 65–99)
HDLC SERPL-MCNC: 48 MG/DL (ref 40–60)
LDLC SERPL CALC-MCNC: 108 MG/DL (ref 0–100)
POTASSIUM SERPL-SCNC: 5.1 MMOL/L (ref 3.5–5.2)
PROT SERPL-MCNC: 7.6 G/DL (ref 6–8.5)
SODIUM SERPL-SCNC: 143 MMOL/L (ref 136–145)
TRIGL SERPL-MCNC: 134 MG/DL (ref 0–150)
VLDLC SERPL CALC-MCNC: 24 MG/DL (ref 5–40)

## 2022-09-28 LAB — DRUGS UR: NORMAL

## 2023-01-10 ENCOUNTER — OFFICE VISIT (OUTPATIENT)
Dept: FAMILY MEDICINE CLINIC | Facility: CLINIC | Age: 43
End: 2023-01-10
Payer: COMMERCIAL

## 2023-01-10 VITALS
RESPIRATION RATE: 14 BRPM | HEIGHT: 70 IN | TEMPERATURE: 97.5 F | HEART RATE: 62 BPM | SYSTOLIC BLOOD PRESSURE: 120 MMHG | WEIGHT: 185.8 LBS | DIASTOLIC BLOOD PRESSURE: 72 MMHG | OXYGEN SATURATION: 99 % | BODY MASS INDEX: 26.6 KG/M2

## 2023-01-10 DIAGNOSIS — H60.12 CELLULITIS OF LEFT EAR: Primary | ICD-10-CM

## 2023-01-10 PROBLEM — H25.041 POSTERIOR SUBCAPSULAR POLAR AGE-RELATED CATARACT OF RIGHT EYE: Status: ACTIVE | Noted: 2022-12-13

## 2023-01-10 PROCEDURE — 99213 OFFICE O/P EST LOW 20 MIN: CPT | Performed by: INTERNAL MEDICINE

## 2023-01-10 NOTE — PROGRESS NOTES
Francis Nayak is a 42 y.o. male.     Chief Complaint   Patient presents with   • Cellulitis     Left ear        Earache   There is pain in the left ear. This is a recurrent problem. The current episode started in the past 7 days. The problem occurs constantly. The problem has been gradually improving. There has been no fever. The pain is at a severity of 3/10. Associated symptoms include neck pain and a rash. Pertinent negatives include no abdominal pain, coughing, diarrhea, rhinorrhea or vomiting.      Answers for HPI/ROS submitted by the patient on 1/9/2023  What is the primary reason for your visit?: Ear Pain    Patient awoke on 1/6/2023 with painful swollen outer ear and went to urgent care was seen and diagnosed with an acute otitis externa of the left ear and given ofloxacin.  Then on 1/7/2023 he went back to the urgent care secondary to the ear pain and is noted to have the left earlobe to be more swollen more painful.  Patient was given Bactrim and Z-Rios.  Here for follow-up.      The following portions of the patient's history were reviewed and updated as appropriate: allergies, current medications, past family history, past medical history, past social history, past surgical history and problem list.    Depression Screen:  PHQ-2/PHQ-9 Depression Screening 9/21/2022   Little Interest or Pleasure in Doing Things 0-->not at all   Feeling Down, Depressed or Hopeless 0-->not at all   PHQ-9: Brief Depression Severity Measure Score 0       Past Medical History:   Diagnosis Date   • Allergic 1991    Penicillin -morphine-tramadol   • Alopecia    • Alternating constipation and diarrhea     DUE TO MEDS   • Asthma 1988   • BMI 25.0-25.9,adult    • Cancer (HCC) Meedulloblastoma-thyroid-basal cell   • Cataract 2004    Unsuccessful due to radiation   • Elevated ferritin    • Encounter for immunization    • Esophageal reflux    • Family history of colon cancer    • History of long-term treatment with  high-risk medication    • Hives    • Hyperlipidemia    • Hypothyroidism    • Left-sided chest wall pain    • Low back pain     Due to radiation of spine   • Neuromuscular disorder (HCC)     Due to radiation   • Pain syndrome, chronic    • Pituitary adenoma (HCC)    • Radiation retinopathy    • Rosacea    • Scoliosis    • Seasonal allergies    • Seizures (HCC)     Due to brain cancer treatment   • Sensorineural hearing loss (SNHL), bilateral    • Vision loss, left eye        Past Surgical History:   Procedure Laterality Date   • BRAIN SURGERY      Medulloblastoma   • CATARACT EXTRACTION     • COLONOSCOPY N/A 1/10/2022    Procedure: COLONOSCOPY TO CECUM AND TERMINAL ILEUM WITH COLD POLYPECTOMIES;  Surgeon: Harjeet Cole MD;  Location: [facility] ENDOSCOPY;  Service: Gastroenterology;  Laterality: N/A;  FAMILY HISTORY OF COLON CANCER  COLON POLYPS, INTERNAL HEMORRHOIDS   • EYE SURGERY      results Strabismus   • MOHS SURGERY Right 2021    ear   • SINUS SURGERY     • THYROID SURGERY     • TOTAL THYROIDECTOMY  10/02/2013    benign folicular neoplasms       Family History   Problem Relation Age of Onset   • Lymphoma Father    • Cancer Father         Diffuse large B cell lymphoma   • COPD Father    • Diabetes Father    • Heart disease Father         Due to cancer treatment   • Hyperlipidemia Father    • Kidney disease Father         Stones-1/3 kidney removed for benign tumors   • Lymphoma Maternal Grandmother    • Heart disease Maternal Grandmother         acute MI   • Coronary artery disease Maternal Grandmother    • Arthritis Maternal Grandmother    • COPD Maternal Grandmother    • Stroke Maternal Grandmother    • Thyroid disease Maternal Grandmother    • Other Maternal Grandmother         Addisons disease   • Alcohol abuse Maternal Grandfather    • Heart disease Maternal Grandfather         First heart attack age 54/ age 62 aneurysm of aorta   • Hyperlipidemia Maternal Grandfather    •  Anxiety disorder Brother         After wife  of COVID-19   • Asthma Brother    • Anxiety disorder Brother         After death of sister-in-law; diagnosis of cancer of himself and his wife within two weeks   • Cancer Brother         3b colon cancer   • Anxiety disorder Mother         After waking up during surgery   • Arthritis Mother    • Hearing loss Mother    • Hyperlipidemia Mother    • Stroke Mother         TIA eye   • Cancer Paternal Grandfather         CLL leukemia   • Kidney disease Paternal Grandmother         Stones   • Malig Hyperthermia Neg Hx        Social History     Socioeconomic History   • Marital status: Single   Tobacco Use   • Smoking status: Never   • Smokeless tobacco: Never   Vaping Use   • Vaping Use: Never used   Substance and Sexual Activity   • Alcohol use: Yes     Alcohol/week: 4.0 standard drinks     Types: 4 Cans of beer per week     Comment: socially   • Drug use: No   • Sexual activity: Not Currently     Partners: Female     Birth control/protection: Condom       Current Outpatient Medications   Medication Sig Dispense Refill   • azithromycin (Zithromax) 250 MG tablet Take 2 tablets the first day, then 1 tablet daily for 4 days. 6 tablet 0   • Cholecalciferol (VITAMIN D3) 125 MCG (5000 UT) capsule capsule Take 5,000 Units by mouth Daily.     • desonide (DESOWEN) 0.05 % lotion APPLY TWICE DAILY AS NEEDED TO ECZEMA     • levothyroxine (SYNTHROID, LEVOTHROID) 150 MCG tablet Take 150 mcg by mouth Daily.     • levothyroxine (SYNTHROID, LEVOTHROID) 150 MCG tablet Take 1 tablet by mouth Daily.     • mometasone (ELOCON) 0.1 % cream Apply  topically to the appropriate area as directed Daily.     • ofloxacin (FLOXIN) 0.3 % otic solution Administer 10 drops to the right ear Daily for 7 days. 5 mL 0   • omeprazole (priLOSEC) 40 MG capsule Take 20 mg by mouth Daily.     • pravastatin (PRAVACHOL) 80 MG tablet Take 80 mg by mouth Daily.     • pravastatin (PRAVACHOL) 80 MG tablet Take 1 tablet by  mouth Daily.     • sulfamethoxazole-trimethoprim (BACTRIM DS,SEPTRA DS) 800-160 MG per tablet Take 1 tablet by mouth 2 (Two) Times a Day. 20 tablet 0   • traMADol (ULTRAM) 50 MG tablet Take 1 tablet by mouth Every 6 (Six) Hours As Needed for Moderate Pain. 30 tablet 0   • Triamcinolone Acetonide (NASACORT) 55 MCG/ACT nasal inhaler 2 sprays into the nostril(s) as directed by provider Daily.       No current facility-administered medications for this visit.     Review of Systems   Constitutional: Negative for activity change, appetite change, fatigue, fever, unexpected weight gain and unexpected weight loss.   HENT: Positive for ear pain. Negative for nosebleeds, rhinorrhea, trouble swallowing and voice change.    Eyes: Negative for visual disturbance.   Respiratory: Negative for cough, chest tightness, shortness of breath and wheezing.    Cardiovascular: Negative for chest pain, palpitations and leg swelling.   Gastrointestinal: Negative for abdominal pain, blood in stool, constipation, diarrhea, nausea, vomiting, GERD and indigestion.   Genitourinary: Negative for dysuria, frequency and hematuria.   Musculoskeletal: Positive for neck pain. Negative for arthralgias, back pain and myalgias.   Skin: Positive for rash. Negative for wound.   Neurological: Negative for dizziness, tremors, weakness, light-headedness, numbness, headache and memory problem.   Hematological: Negative for adenopathy. Does not bruise/bleed easily.   Psychiatric/Behavioral: Negative for sleep disturbance and depressed mood. The patient is not nervous/anxious.      Objective   /72 (BP Location: Left arm, Patient Position: Sitting, Cuff Size: Adult)   Pulse 62   Temp 97.5 °F (36.4 °C) (Temporal)   Resp 14   Ht 177.8 cm (70\")   Wt 84.3 kg (185 lb 12.8 oz)   SpO2 99%   BMI 26.66 kg/m²     Physical Exam  Vitals and nursing note reviewed.   Constitutional:       General: He is not in acute distress.     Appearance: He is well-developed.  He is not diaphoretic.   HENT:      Head: Normocephalic and atraumatic.      Right Ear: External ear normal.      Left Ear: External ear normal.      Nose: Nose normal.   Eyes:      Conjunctiva/sclera: Conjunctivae normal.      Pupils: Pupils are equal, round, and reactive to light.   Neck:      Thyroid: No thyromegaly.      Trachea: No tracheal deviation.   Cardiovascular:      Rate and Rhythm: Normal rate and regular rhythm.      Heart sounds: Normal heart sounds. No murmur heard.    No friction rub. No gallop.   Pulmonary:      Effort: Pulmonary effort is normal. No respiratory distress.      Breath sounds: Normal breath sounds.   Abdominal:      General: Bowel sounds are normal.      Palpations: Abdomen is soft. There is no mass.      Tenderness: There is no abdominal tenderness. There is no guarding.   Musculoskeletal:         General: Normal range of motion.      Cervical back: Normal range of motion and neck supple.   Lymphadenopathy:      Cervical: No cervical adenopathy.   Skin:     General: Skin is warm and dry.      Capillary Refill: Capillary refill takes less than 2 seconds.      Findings: No rash.      Comments: Left ear mild red and mild swelling but no tenderness or drainage.  Not extend to neck.  Much better per patient.   Neurological:      Mental Status: He is alert and oriented to person, place, and time.      Motor: No abnormal muscle tone.      Deep Tendon Reflexes: Reflexes normal.   Psychiatric:         Behavior: Behavior normal.         Thought Content: Thought content normal.         Judgment: Judgment normal.       No results found for this or any previous visit (from the past 2016 hour(s)).  Assessment & Plan   Diagnoses and all orders for this visit:    1. Cellulitis of left ear (Primary)    Continue and complete the antibiotics as needed and observe.  No new treatment at this time and follow up as needed.           · COVID-19 Precautions - Patient was compliant in wearing a mask. When I  saw the patient, I used appropriate personal protective equipment (PPE) including mask and eye shield (standard procedure).  Additionally, I used gown and gloves if indicated.  Hand hygiene was completed before and after seeing the patient.  · Dictated utilizing Dragon Dictation

## 2023-03-21 ENCOUNTER — OFFICE VISIT (OUTPATIENT)
Dept: FAMILY MEDICINE CLINIC | Facility: CLINIC | Age: 43
End: 2023-03-21
Payer: COMMERCIAL

## 2023-03-21 VITALS
DIASTOLIC BLOOD PRESSURE: 86 MMHG | SYSTOLIC BLOOD PRESSURE: 136 MMHG | OXYGEN SATURATION: 99 % | HEART RATE: 62 BPM | BODY MASS INDEX: 27.26 KG/M2 | WEIGHT: 190 LBS

## 2023-03-21 DIAGNOSIS — E78.5 HYPERLIPIDEMIA, UNSPECIFIED HYPERLIPIDEMIA TYPE: Primary | ICD-10-CM

## 2023-03-21 DIAGNOSIS — R79.89 ELEVATED FERRITIN: ICD-10-CM

## 2023-03-21 DIAGNOSIS — E55.9 VITAMIN D DEFICIENCY: ICD-10-CM

## 2023-03-21 PROCEDURE — 99213 OFFICE O/P EST LOW 20 MIN: CPT | Performed by: INTERNAL MEDICINE

## 2023-03-21 RX ORDER — FLUTICASONE PROPIONATE 0.05 %
CREAM (GRAM) TOPICAL
COMMUNITY
Start: 2023-01-17

## 2023-03-21 NOTE — PROGRESS NOTES
Francis Nayak is a 42 y.o. male.     Chief Complaint   Patient presents with   • Hypothyroidism   • Hyperlipidemia       History of Present Illness   Follow-up for thyroid.  Denies fatigue, weakness, constipation/diarrhea, hair/skin changes, weight gain/loss, depression/anxiety, rashes, palpitations, swelling, chest pain, shortness of breath or other issues.  Has been compliant with taking the medication of levothyroxine 150 mcg with no recent changes.  Denies any difficulty with the current medication.  Is being followed by endocrinology  Last thyroid test on 12/5/2022 with a TSH of 3.8     Follow-up for cholesterol.  Currently, has been feeling well without any myalgias, muscle aches, weakness, numbness, chest pain, short of breath or other issues.  Currently, is adherent with medication regimen of pravastatin 80 mg and denies medication side effects. Is due for lab follow-up.     The following portions of the patient's history were reviewed and updated as appropriate: allergies, current medications, past family history, past medical history, past social history, past surgical history and problem list.    Depression Screen:  PHQ-2/PHQ-9 Depression Screening 9/21/2022   Little Interest or Pleasure in Doing Things 0-->not at all   Feeling Down, Depressed or Hopeless 0-->not at all   PHQ-9: Brief Depression Severity Measure Score 0       Past Medical History:   Diagnosis Date   • Allergic 1991    Penicillin -morphine-tramadol   • Alopecia    • Alternating constipation and diarrhea     DUE TO MEDS   • Asthma 1988   • BMI 25.0-25.9,adult    • Cancer (HCC) Meedulloblastoma-thyroid-basal cell   • Cataract 2004    Unsuccessful due to radiation   • Elevated ferritin    • Encounter for immunization    • Esophageal reflux    • Family history of colon cancer    • History of long-term treatment with high-risk medication    • Hives    • Hyperlipidemia    • Hypothyroidism    • Left-sided chest wall pain    • Low  back pain     Due to radiation of spine   • Neuromuscular disorder (HCC)     Due to radiation   • Pain syndrome, chronic    • Pituitary adenoma (HCC)    • Radiation retinopathy    • Rosacea    • Scoliosis    • Seasonal allergies    • Seizures (HCC)     Due to brain cancer treatment   • Sensorineural hearing loss (SNHL), bilateral    • Vision loss, left eye        Past Surgical History:   Procedure Laterality Date   • BRAIN SURGERY      Medulloblastoma   • CATARACT EXTRACTION     • COLONOSCOPY N/A 1/10/2022    Procedure: COLONOSCOPY TO CECUM AND TERMINAL ILEUM WITH COLD POLYPECTOMIES;  Surgeon: Harjeet Cole MD;  Location: [facility] ENDOSCOPY;  Service: Gastroenterology;  Laterality: N/A;  FAMILY HISTORY OF COLON CANCER  COLON POLYPS, INTERNAL HEMORRHOIDS   • EYE SURGERY      results Strabismus   • MOHS SURGERY Right 2021    ear   • SINUS SURGERY     • THYROID SURGERY     • TOTAL THYROIDECTOMY  10/02/2013    benign folicular neoplasms       Family History   Problem Relation Age of Onset   • Lymphoma Father    • Cancer Father         Diffuse large B cell lymphoma   • COPD Father    • Diabetes Father    • Heart disease Father         Due to cancer treatment   • Hyperlipidemia Father    • Kidney disease Father         Stones-1/3 kidney removed for benign tumors   • Lymphoma Maternal Grandmother    • Heart disease Maternal Grandmother         acute MI   • Coronary artery disease Maternal Grandmother    • Arthritis Maternal Grandmother    • COPD Maternal Grandmother    • Stroke Maternal Grandmother    • Thyroid disease Maternal Grandmother    • Other Maternal Grandmother         Addisons disease   • Alcohol abuse Maternal Grandfather    • Heart disease Maternal Grandfather         First heart attack age 56/ age 64 aneurysm of aorta   • Hyperlipidemia Maternal Grandfather    • Anxiety disorder Brother         After wife  of COVID-19   • Asthma Brother    • Anxiety disorder Brother          After death of sister-in-law; diagnosis of cancer of himself and his wife within two weeks   • Cancer Brother         3b colon cancer   • Anxiety disorder Mother         After waking up during surgery   • Arthritis Mother    • Hearing loss Mother    • Hyperlipidemia Mother    • Stroke Mother         TIA eye   • Cancer Paternal Grandfather         CLL leukemia   • Kidney disease Paternal Grandmother         Stones   • Malig Hyperthermia Neg Hx        Social History     Socioeconomic History   • Marital status: Single   Tobacco Use   • Smoking status: Never   • Smokeless tobacco: Never   Vaping Use   • Vaping Use: Never used   Substance and Sexual Activity   • Alcohol use: Yes     Alcohol/week: 4.0 standard drinks     Types: 4 Cans of beer per week     Comment: socially   • Drug use: No   • Sexual activity: Not Currently     Partners: Female     Birth control/protection: Condom       Current Outpatient Medications   Medication Sig Dispense Refill   • Cholecalciferol (VITAMIN D3) 125 MCG (5000 UT) capsule capsule Take 1 capsule by mouth Daily.     • desonide (DESOWEN) 0.05 % lotion APPLY TWICE DAILY AS NEEDED TO ECZEMA     • fluticasone (CUTIVATE) 0.05 % cream APPLY TO AFFECTED AREA TWICE A DAY AS NEEDED FOR FLARES     • levothyroxine (SYNTHROID, LEVOTHROID) 150 MCG tablet Take 1 tablet by mouth Daily.     • mometasone (ELOCON) 0.1 % cream Apply  topically to the appropriate area as directed Daily.     • omeprazole (priLOSEC) 40 MG capsule Take 20 mg by mouth Daily.     • pravastatin (PRAVACHOL) 80 MG tablet Take 1 tablet by mouth Daily.     • traMADol (ULTRAM) 50 MG tablet Take 1 tablet by mouth Every 6 (Six) Hours As Needed for Moderate Pain. 30 tablet 0   • Triamcinolone Acetonide (NASACORT) 55 MCG/ACT nasal inhaler 2 sprays into the nostril(s) as directed by provider Daily.       No current facility-administered medications for this visit.       Review of Systems   Constitutional: Negative for activity change,  appetite change, fatigue, fever, unexpected weight gain and unexpected weight loss.   HENT: Negative for nosebleeds, rhinorrhea, trouble swallowing and voice change.    Eyes: Negative for visual disturbance.   Respiratory: Negative for cough, chest tightness, shortness of breath and wheezing.    Cardiovascular: Negative for chest pain, palpitations and leg swelling.   Gastrointestinal: Negative for abdominal pain, blood in stool, constipation, diarrhea, nausea, vomiting, GERD and indigestion.   Genitourinary: Negative for dysuria, frequency and hematuria.   Musculoskeletal: Negative for arthralgias, back pain and myalgias.   Skin: Negative for rash and wound.   Neurological: Negative for dizziness, tremors, weakness, light-headedness, numbness, headache and memory problem.   Hematological: Negative for adenopathy. Does not bruise/bleed easily.   Psychiatric/Behavioral: Negative for sleep disturbance and depressed mood. The patient is not nervous/anxious.        Objective   /86 (BP Location: Left arm, Patient Position: Sitting, Cuff Size: Adult)   Pulse 62   Wt 86.2 kg (190 lb)   SpO2 99%   BMI 27.26 kg/m²     Physical Exam  Vitals and nursing note reviewed.   Constitutional:       General: He is not in acute distress.     Appearance: He is well-developed. He is not diaphoretic.   HENT:      Head: Normocephalic and atraumatic.      Right Ear: External ear normal.      Left Ear: External ear normal.      Nose: Nose normal.   Eyes:      Conjunctiva/sclera: Conjunctivae normal.      Pupils: Pupils are equal, round, and reactive to light.   Neck:      Thyroid: No thyromegaly.      Trachea: No tracheal deviation.   Cardiovascular:      Rate and Rhythm: Normal rate and regular rhythm.      Heart sounds: Normal heart sounds. No murmur heard.    No friction rub. No gallop.   Pulmonary:      Effort: Pulmonary effort is normal. No respiratory distress.      Breath sounds: Normal breath sounds.   Abdominal:       General: Bowel sounds are normal.      Palpations: Abdomen is soft. There is no mass.      Tenderness: There is no abdominal tenderness. There is no guarding.   Musculoskeletal:         General: Normal range of motion.      Cervical back: Normal range of motion and neck supple.   Lymphadenopathy:      Cervical: No cervical adenopathy.   Skin:     General: Skin is warm and dry.      Capillary Refill: Capillary refill takes less than 2 seconds.      Findings: No rash.   Neurological:      Mental Status: He is alert and oriented to person, place, and time.      Motor: No abnormal muscle tone.      Deep Tendon Reflexes: Reflexes normal.   Psychiatric:         Behavior: Behavior normal.         Thought Content: Thought content normal.         Judgment: Judgment normal.         No results found for this or any previous visit (from the past 2016 hour(s)).  Assessment & Plan   Diagnoses and all orders for this visit:    1. Hyperlipidemia, unspecified hyperlipidemia type (Primary)  -     Comprehensive Metabolic Panel  -     Lipid Panel    2. Vitamin D deficiency  -     Vitamin D,25-Hydroxy    3. Elevated ferritin  -     Ferritin    Continue the current medications and follow up with endocrinology.  Check the labs as ordered.  BP was initially elevated but may be more reactive.  Discussed and encouraged diet and exercise.           · COVID-19 Precautions - Patient was compliant in wearing a mask. When I saw the patient, I used appropriate personal protective equipment (PPE) including mask and eye shield (standard procedure).  Additionally, I used gown and gloves if indicated.  Hand hygiene was completed before and after seeing the patient.  · Dictated utilizing Dragon Dictation

## 2023-03-22 LAB
25(OH)D3+25(OH)D2 SERPL-MCNC: 67.2 NG/ML (ref 30–100)
ALBUMIN SERPL-MCNC: 4.6 G/DL (ref 4–5)
ALBUMIN/GLOB SERPL: 1.6 {RATIO} (ref 1.2–2.2)
ALP SERPL-CCNC: 85 IU/L (ref 44–121)
ALT SERPL-CCNC: 32 IU/L (ref 0–44)
AST SERPL-CCNC: 27 IU/L (ref 0–40)
BILIRUB SERPL-MCNC: 0.5 MG/DL (ref 0–1.2)
BUN SERPL-MCNC: 15 MG/DL (ref 6–24)
BUN/CREAT SERPL: 15 (ref 9–20)
CALCIUM SERPL-MCNC: 9.7 MG/DL (ref 8.7–10.2)
CHLORIDE SERPL-SCNC: 100 MMOL/L (ref 96–106)
CHOLEST SERPL-MCNC: 162 MG/DL (ref 100–199)
CO2 SERPL-SCNC: 28 MMOL/L (ref 20–29)
CREAT SERPL-MCNC: 1.02 MG/DL (ref 0.76–1.27)
EGFRCR SERPLBLD CKD-EPI 2021: 93 ML/MIN/1.73
FERRITIN SERPL-MCNC: 408 NG/ML (ref 30–400)
GLOBULIN SER CALC-MCNC: 2.9 G/DL (ref 1.5–4.5)
GLUCOSE SERPL-MCNC: 84 MG/DL (ref 70–99)
HDLC SERPL-MCNC: 48 MG/DL
LDLC SERPL CALC-MCNC: 95 MG/DL (ref 0–99)
POTASSIUM SERPL-SCNC: 4.5 MMOL/L (ref 3.5–5.2)
PROT SERPL-MCNC: 7.5 G/DL (ref 6–8.5)
SODIUM SERPL-SCNC: 141 MMOL/L (ref 134–144)
TRIGL SERPL-MCNC: 103 MG/DL (ref 0–149)
VLDLC SERPL CALC-MCNC: 19 MG/DL (ref 5–40)

## 2023-09-01 NOTE — H&P
[facility]  Pre Procedure History & Physical    Chief Complaint:   Time for my colonoscopy    Subjective     HPI:   41 y.o. male whose brother was diagnosed with colon cancer at 47 years of age recently.    Past Medical History:   Past Medical History:   Diagnosis Date   • Allergic 1991    Penicillin -morphine-tramadol   • Alopecia    • Alternating constipation and diarrhea     DUE TO MEDS   • Asthma 1988   • BMI 25.0-25.9,adult    • Cancer (HCC) Meedulloblastoma-thyroid-basal cell   • Cataract 2004    Unsuccessful due to radiation   • Elevated ferritin    • Encounter for immunization    • Esophageal reflux    • Family history of colon cancer    • History of long-term treatment with high-risk medication    • Hives    • Hyperlipidemia    • Hypothyroidism    • Left-sided chest wall pain    • Low back pain 1995    Due to radiation of spine   • Neuromuscular disorder (HCC) 2012    Due to radiation   • Pain syndrome, chronic    • Pituitary adenoma (HCC)    • Radiation retinopathy    • Rosacea    • Scoliosis    • Seasonal allergies    • Seizures (HCC) 1994    Due to brain cancer treatment   • Sensorineural hearing loss (SNHL), bilateral    • Vision loss, left eye        Family History:  Family History   Problem Relation Age of Onset   • Lymphoma Father    • Cancer Father         Diffuse large B cell lymphoma   • COPD Father    • Diabetes Father    • Heart disease Father         Due to cancer treatment   • Hyperlipidemia Father    • Kidney disease Father         Stones-1/3 kidney removed for benign tumors   • Lymphoma Maternal Grandmother    • Heart disease Maternal Grandmother         acute MI   • Coronary artery disease Maternal Grandmother    • Arthritis Maternal Grandmother    • COPD Maternal Grandmother    • Stroke Maternal Grandmother    • Thyroid disease Maternal Grandmother    • Other Maternal Grandmother         Addisons disease   • Alcohol abuse Maternal Grandfather    • Heart disease  Maternal Grandfather         First heart attack age 54/ age 62 aneurysm of aorta   • Hyperlipidemia Maternal Grandfather    • Anxiety disorder Brother         After wife  of COVID-19   • Asthma Brother    • Anxiety disorder Brother         After death of sister-in-law; diagnosis of cancer of himself and his wife within two weeks   • Cancer Brother         3b colon cancer   • Anxiety disorder Mother         After waking up during surgery   • Arthritis Mother    • Hearing loss Mother    • Hyperlipidemia Mother    • Stroke Mother         TIA eye   • Cancer Paternal Grandfather         CLL leukemia   • Kidney disease Paternal Grandmother         Stones   • Malig Hyperthermia Neg Hx        Social History:   reports that he has never smoked. He has never used smokeless tobacco. He reports current alcohol use of about 4.0 standard drinks of alcohol per week. He reports that he does not use drugs.    Medications:   Medications Prior to Admission   Medication Sig Dispense Refill Last Dose   • Cholecalciferol (VITAMIN D3) 125 MCG (5000 UT) capsule capsule Take 5,000 Units by mouth Daily.   Past Week at Unknown time   • desonide (DESOWEN) 0.05 % lotion APPLY TWICE DAILY AS NEEDED TO ECZEMA   Past Month at Unknown time   • levothyroxine (SYNTHROID, LEVOTHROID) 150 MCG tablet Take 150 mcg by mouth Daily.   2022 at Unknown time   • mometasone (ELOCON) 0.1 % cream Apply  topically to the appropriate area as directed Daily.   Past Month at Unknown time   • omeprazole (priLOSEC) 40 MG capsule Take 20 mg by mouth Daily.   Past Week at Unknown time   • pravastatin (PRAVACHOL) 80 MG tablet Take 80 mg by mouth Daily.   Past Week at Unknown time   • traMADol (ULTRAM) 50 MG tablet Take 1 tablet by mouth Every 6 (Six) Hours As Needed for Moderate Pain . 30 tablet 0 Past Month at Unknown time   • Triamcinolone Acetonide (NASACORT ALLERGY 24HR) 55 MCG/ACT nasal inhaler 2 sprays into the nostril(s) as directed by provider Daily.    "1/9/2022 at Unknown time       Allergies:  Penicillins, Latex, and Morphine    ROS:    Pertinent items are noted in HPI     Objective     Blood pressure 127/95, pulse 68, resp. rate 22, height 177.8 cm (70\"), weight 79.2 kg (174 lb 8 oz), SpO2 100 %.    Physical Exam   Constitutional: Pt is oriented to person, place, and time and well-developed, well-nourished, and in no distress.   HENT:   Mouth/Throat: Oropharynx is clear and moist.   Neck: Normal range of motion. Neck supple.   Cardiovascular: Normal rate, regular rhythm and normal heart sounds.    Pulmonary/Chest: Effort normal and breath sounds normal. No respiratory distress. No  wheezes.   Abdominal: Soft. Bowel sounds are normal.   Skin: Skin is warm and dry.   Psychiatric: Mood, memory, affect and judgment normal.     Assessment/Plan     Diagnosis:  41 y.o. male whose brother was diagnosed with colon cancer at 47 years of age recently.    Anticipated Surgical Procedure:  Colonoscopy    The risks, benefits, and alternatives of this procedure have been discussed with the patient or the responsible party- the patient understands and agrees to proceed.    Harjeet Cole M.D.  " Detail Level: Detailed Depth Of Biopsy: dermis Was A Bandage Applied: Yes Size Of Lesion In Cm: 0 Biopsy Type: H and E Biopsy Method: Dermablade Anesthesia Type: 1% lidocaine with epinephrine Anesthesia Volume In Cc: 0.5 Hemostasis: Drysol Wound Care: Petrolatum Dressing: bandage Destruction After The Procedure: No Type Of Destruction Used: Curettage Curettage Text: The wound bed was treated with curettage after the biopsy was performed. Cryotherapy Text: The wound bed was treated with cryotherapy after the biopsy was performed. Electrodesiccation Text: The wound bed was treated with electrodesiccation after the biopsy was performed. Electrodesiccation And Curettage Text: The wound bed was treated with electrodesiccation and curettage after the biopsy was performed. Silver Nitrate Text: The wound bed was treated with silver nitrate after the biopsy was performed. Lab: [id number] Lab Facility: [id number] Consent: Written consent was obtained and risks were reviewed including but not limited to scarring, infection, bleeding, scabbing, incomplete removal, nerve damage and allergy to anesthesia. Post-Care Instructions: I reviewed with the patient in detail post-care instructions. Patient is to keep the biopsy site dry overnight, and then apply bacitracin twice daily until healed. Patient may apply hydrogen peroxide soaks to remove any crusting. Notification Instructions: Patient will be notified of biopsy results. However, patient instructed to call the office if not contacted within 2 weeks. Billing Type: Third-Party Bill Information: Selecting Yes will display possible errors in your note based on the variables you have selected. This validation is only offered as a suggestion for you. PLEASE NOTE THAT THE VALIDATION TEXT WILL BE REMOVED WHEN YOU FINALIZE YOUR NOTE. IF YOU WANT TO FAX A PRELIMINARY NOTE YOU WILL NEED TO TOGGLE THIS TO 'NO' IF YOU DO NOT WANT IT IN YOUR FAXED NOTE.

## 2023-09-19 ENCOUNTER — OFFICE VISIT (OUTPATIENT)
Dept: FAMILY MEDICINE CLINIC | Facility: CLINIC | Age: 43
End: 2023-09-19
Payer: COMMERCIAL

## 2023-09-19 VITALS
OXYGEN SATURATION: 99 % | TEMPERATURE: 98 F | HEIGHT: 70 IN | BODY MASS INDEX: 25.8 KG/M2 | DIASTOLIC BLOOD PRESSURE: 90 MMHG | SYSTOLIC BLOOD PRESSURE: 128 MMHG | WEIGHT: 180.2 LBS | HEART RATE: 86 BPM

## 2023-09-19 DIAGNOSIS — E03.8 HYPOTHYROIDISM DUE TO HASHIMOTO'S THYROIDITIS: ICD-10-CM

## 2023-09-19 DIAGNOSIS — M54.10 RADICULOPATHY AFFECTING UPPER EXTREMITY: ICD-10-CM

## 2023-09-19 DIAGNOSIS — E55.9 VITAMIN D DEFICIENCY: ICD-10-CM

## 2023-09-19 DIAGNOSIS — E78.5 HYPERLIPIDEMIA, UNSPECIFIED HYPERLIPIDEMIA TYPE: Primary | ICD-10-CM

## 2023-09-19 DIAGNOSIS — E06.3 HYPOTHYROIDISM DUE TO HASHIMOTO'S THYROIDITIS: ICD-10-CM

## 2023-09-19 DIAGNOSIS — R79.89 ELEVATED FERRITIN: ICD-10-CM

## 2023-09-19 DIAGNOSIS — G89.4 PAIN SYNDROME, CHRONIC: ICD-10-CM

## 2023-09-19 PROCEDURE — 99213 OFFICE O/P EST LOW 20 MIN: CPT | Performed by: INTERNAL MEDICINE

## 2023-09-19 RX ORDER — TRAMADOL HYDROCHLORIDE 50 MG/1
50 TABLET ORAL EVERY 6 HOURS PRN
Qty: 30 TABLET | Refills: 0 | Status: SHIPPED | OUTPATIENT
Start: 2023-09-19

## 2023-09-19 NOTE — PROGRESS NOTES
Francis Nayak is a 43 y.o. male.     Chief Complaint   Patient presents with    Hyperlipidemia    Hypothyroidism    Chest Pain     He says he has talked to you about it before and believes it may be due to radiation. He has been having pain in his left side. He said it feels like cramp.       Hyperlipidemia  Exacerbating diseases include hypothyroidism. Associated symptoms include chest pain. Pertinent negatives include no myalgias or shortness of breath.   Hypothyroidism  Associated symptoms include chest pain. Pertinent negatives include no abdominal pain, arthralgias, coughing, fatigue, fever, myalgias, nausea, numbness, rash, vomiting or weakness.   Chest Pain   Pertinent negatives include no abdominal pain, back pain, cough, dizziness, fever, nausea, numbness, palpitations, shortness of breath, vomiting or weakness.   His past medical history is significant for hyperlipidemia.      Follow-up for thyroid.  Denies fatigue, weakness, constipation/diarrhea, hair/skin changes, weight gain/loss, depression/anxiety, rashes, palpitations, swelling, chest pain, shortness of breath or other issues.  Has been compliant with taking the medication of levothyroxine 150 mcg with no recent changes.  Denies any difficulty with the current medication.  Is being followed by endocrinology  Last thyroid test on 12/5/2022 with a TSH of 3.8     Follow-up for cholesterol.  Currently, has been feeling well without any myalgias, muscle aches, weakness, numbness, chest pain, short of breath or other issues.  Currently, is adherent with medication regimen of pravastatin 80 mg and denies medication side effects. Is due for lab follow-up.    Patient does have a history of some chest discomfort that it comes and goes.  Is been ever since he had his radiation therapy.  Is more on the left side and feels more like a cramp that occurs with deep breath and movement.  Denies any new shortness of breath nausea vomiting or syncopal  episodes.  Pain does not radiate to his arm or neck.  It resolved after 2-3 days and resolved with tramadol.  Has no issues at this time.    The following portions of the patient's history were reviewed and updated as appropriate: allergies, current medications, past family history, past medical history, past social history, past surgical history and problem list.    Depression Screen:      9/21/2022     1:21 PM   PHQ-2/PHQ-9 Depression Screening   Little Interest or Pleasure in Doing Things 0-->not at all   Feeling Down, Depressed or Hopeless 0-->not at all   PHQ-9: Brief Depression Severity Measure Score 0       Past Medical History:   Diagnosis Date    Allergic 1991    Penicillin -morphine-tramadol    Alopecia     Alternating constipation and diarrhea     DUE TO MEDS    Asthma 1988    BMI 25.0-25.9,adult     Cancer Meedulloblastoma-thyroid-basal cell    Cataract 2004    Unsuccessful due to radiation    Elevated ferritin     Encounter for immunization     Esophageal reflux     Family history of colon cancer     History of long-term treatment with high-risk medication     Hives     Hyperlipidemia     Hypothyroidism     Left-sided chest wall pain     Low back pain 1995    Due to radiation of spine    Neuromuscular disorder 2012    Due to radiation    Pain syndrome, chronic     Pituitary adenoma     Radiation retinopathy     Rosacea     Scoliosis     Seasonal allergies     Seizures 1994    Due to brain cancer treatment    Sensorineural hearing loss (SNHL), bilateral     Vision loss, left eye        Past Surgical History:   Procedure Laterality Date    BRAIN SURGERY  1994    Medulloblastoma    CATARACT EXTRACTION  2004    COLONOSCOPY N/A 1/10/2022    Procedure: COLONOSCOPY TO CECUM AND TERMINAL ILEUM WITH COLD POLYPECTOMIES;  Surgeon: Harjeet Cole MD;  [facility]: [facility] ENDOSCOPY;  Service: Gastroenterology;  Laterality: N/A;  FAMILY HISTORY OF COLON CANCER  COLON POLYPS, INTERNAL HEMORRHOIDS    EYE SURGERY  1994     results Strabismus    MOHS SURGERY Right 2021    ear    SINUS SURGERY      THYROID SURGERY      TOTAL THYROIDECTOMY  10/02/2013    benign folicular neoplasms       Family History   Problem Relation Age of Onset    Lymphoma Father     Cancer Father         Diffuse large B cell lymphoma    COPD Father     Diabetes Father     Heart disease Father         Due to cancer treatment    Hyperlipidemia Father     Kidney disease Father         Stones-/3 kidney removed for benign tumors    Lymphoma Maternal Grandmother     Heart disease Maternal Grandmother         acute MI    Coronary artery disease Maternal Grandmother     Arthritis Maternal Grandmother     COPD Maternal Grandmother     Stroke Maternal Grandmother     Thyroid disease Maternal Grandmother     Other Maternal Grandmother         Addisons disease    Alcohol abuse Maternal Grandfather     Heart disease Maternal Grandfather         First heart attack age 54/ age 64 aneurysm of aorta    Hyperlipidemia Maternal Grandfather     Anxiety disorder Brother         After wife  of COVID-19    Asthma Brother     Anxiety disorder Brother         After death of sister-in-law; diagnosis of cancer of himself and his wife within two weeks    Cancer Brother         3b colon cancer    Anxiety disorder Mother         After waking up during surgery    Arthritis Mother     Hearing loss Mother     Hyperlipidemia Mother     Stroke Mother         TIA eye    Cancer Paternal Grandfather         CLL leukemia    Kidney disease Paternal Grandmother         Stones    Malig Hyperthermia Neg Hx        Social History     Socioeconomic History    Marital status: Single   Tobacco Use    Smoking status: Never    Smokeless tobacco: Never   Vaping Use    Vaping Use: Never used   Substance and Sexual Activity    Alcohol use: Yes     Alcohol/week: 4.0 standard drinks     Types: 4 Cans of beer per week     Comment: socially    Drug use: No    Sexual activity: Not Currently      Partners: Female     Birth control/protection: Condom       Current Outpatient Medications   Medication Sig Dispense Refill    Cholecalciferol (VITAMIN D3) 125 MCG (5000 UT) capsule capsule Take 1 capsule by mouth Daily.      desonide (DESOWEN) 0.05 % lotion APPLY TWICE DAILY AS NEEDED TO ECZEMA      fluticasone (CUTIVATE) 0.05 % cream APPLY TO AFFECTED AREA TWICE A DAY AS NEEDED FOR FLARES      levothyroxine (SYNTHROID, LEVOTHROID) 150 MCG tablet Take 1 tablet by mouth Daily.      mometasone (ELOCON) 0.1 % cream Apply  topically to the appropriate area as directed Daily.      omeprazole (priLOSEC) 40 MG capsule Take 20 mg by mouth Daily.      pravastatin (PRAVACHOL) 80 MG tablet Take 1 tablet by mouth Daily.      traMADol (ULTRAM) 50 MG tablet Take 1 tablet by mouth Every 6 (Six) Hours As Needed for Moderate Pain. 30 tablet 0    Triamcinolone Acetonide (NASACORT) 55 MCG/ACT nasal inhaler 2 sprays into the nostril(s) as directed by provider Daily.       No current facility-administered medications for this visit.       Review of Systems   Constitutional:  Negative for activity change, appetite change, fatigue, fever, unexpected weight gain and unexpected weight loss.   HENT:  Negative for nosebleeds, rhinorrhea, trouble swallowing and voice change.    Eyes:  Negative for visual disturbance.   Respiratory:  Negative for cough, chest tightness, shortness of breath and wheezing.    Cardiovascular:  Positive for chest pain. Negative for palpitations and leg swelling.   Gastrointestinal:  Negative for abdominal pain, blood in stool, constipation, diarrhea, nausea, vomiting, GERD and indigestion.   Genitourinary:  Negative for dysuria, frequency and hematuria.   Musculoskeletal:  Negative for arthralgias, back pain and myalgias.   Skin:  Negative for rash and wound.   Neurological:  Negative for dizziness, tremors, weakness, light-headedness, numbness, headache and memory problem.   Hematological:  Negative for  "adenopathy. Does not bruise/bleed easily.   Psychiatric/Behavioral:  Negative for sleep disturbance and depressed mood. The patient is not nervous/anxious.      Objective   /90 (BP Location: Left arm, Patient Position: Sitting, Cuff Size: Adult)   Pulse 86   Temp 98 °F (36.7 °C) (Temporal)   Ht 177.8 cm (70\")   Wt 81.7 kg (180 lb 3.2 oz)   SpO2 99%   BMI 25.86 kg/m²     Physical Exam  Vitals and nursing note reviewed.   Constitutional:       General: He is not in acute distress.     Appearance: He is well-developed. He is not diaphoretic.   HENT:      Head: Normocephalic and atraumatic.      Right Ear: External ear normal.      Left Ear: External ear normal.      Nose: Nose normal.   Eyes:      Conjunctiva/sclera: Conjunctivae normal.      Pupils: Pupils are equal, round, and reactive to light.   Neck:      Thyroid: No thyromegaly.      Trachea: No tracheal deviation.   Cardiovascular:      Rate and Rhythm: Normal rate and regular rhythm.      Heart sounds: Normal heart sounds. No murmur heard.    No friction rub. No gallop.   Pulmonary:      Effort: Pulmonary effort is normal. No respiratory distress.      Breath sounds: Normal breath sounds.   Abdominal:      General: Bowel sounds are normal.      Palpations: Abdomen is soft. There is no mass.      Tenderness: There is no abdominal tenderness. There is no guarding.   Musculoskeletal:         General: Normal range of motion.      Cervical back: Normal range of motion and neck supple.   Lymphadenopathy:      Cervical: No cervical adenopathy.   Skin:     General: Skin is warm and dry.      Capillary Refill: Capillary refill takes less than 2 seconds.      Findings: No rash.   Neurological:      Mental Status: He is alert and oriented to person, place, and time.      Motor: No abnormal muscle tone.      Deep Tendon Reflexes: Reflexes normal.   Psychiatric:         Behavior: Behavior normal.         Thought Content: Thought content normal.         Judgment: " Judgment normal.     No results found for this or any previous visit (from the past 2016 hour(s)).  Assessment & Plan   Diagnoses and all orders for this visit:    1. Hyperlipidemia, unspecified hyperlipidemia type (Primary)  -     Comprehensive Metabolic Panel  -     Lipid Panel    2. Hypothyroidism due to Hashimoto's thyroiditis  -     TSH  -     T4, Free    3. Vitamin D deficiency  -     Vitamin D,25-Hydroxy    4. Elevated ferritin  -     Ferritin    5. Pain syndrome, chronic  -     traMADol (ULTRAM) 50 MG tablet; Take 1 tablet by mouth Every 6 (Six) Hours As Needed for Moderate Pain.  Dispense: 30 tablet; Refill: 0    6. Radiculopathy affecting upper extremity  -     traMADol (ULTRAM) 50 MG tablet; Take 1 tablet by mouth Every 6 (Six) Hours As Needed for Moderate Pain.  Dispense: 30 tablet; Refill: 0    Continue the current medications unchanged and follow up with specialists as scheduled.  Labs to be obtained as noted above.  Adjust based upon the results.  Controlled substance agreement is up-to-date.  WIN run and reviewed.  Risks of the medication include but are not limited to fatigue, somnolence, increased risk of falls, constipation, allergic reaction, dependence, and addiction           COVID-19 Precautions - Patient was compliant in wearing a mask. When I saw the patient, I used appropriate personal protective equipment (PPE) including mask and eye shield (standard procedure).  Additionally, I used gown and gloves if indicated.  Hand hygiene was completed before and after seeing the patient.  Dictated utilizing Dragon Dictation

## 2023-09-20 LAB
25(OH)D3+25(OH)D2 SERPL-MCNC: 73.9 NG/ML (ref 30–100)
ALBUMIN SERPL-MCNC: 4.9 G/DL (ref 4.1–5.1)
ALBUMIN/GLOB SERPL: 1.6 {RATIO} (ref 1.2–2.2)
ALP SERPL-CCNC: 88 IU/L (ref 44–121)
ALT SERPL-CCNC: 25 IU/L (ref 0–44)
AST SERPL-CCNC: 19 IU/L (ref 0–40)
BILIRUB SERPL-MCNC: 0.5 MG/DL (ref 0–1.2)
BUN SERPL-MCNC: 14 MG/DL (ref 6–24)
BUN/CREAT SERPL: 13 (ref 9–20)
CALCIUM SERPL-MCNC: 9.9 MG/DL (ref 8.7–10.2)
CHLORIDE SERPL-SCNC: 98 MMOL/L (ref 96–106)
CHOLEST SERPL-MCNC: 164 MG/DL (ref 100–199)
CO2 SERPL-SCNC: 26 MMOL/L (ref 20–29)
CREAT SERPL-MCNC: 1.1 MG/DL (ref 0.76–1.27)
EGFRCR SERPLBLD CKD-EPI 2021: 84 ML/MIN/1.73
FERRITIN SERPL-MCNC: 408 NG/ML (ref 30–400)
GLOBULIN SER CALC-MCNC: 3.1 G/DL (ref 1.5–4.5)
GLUCOSE SERPL-MCNC: 98 MG/DL (ref 70–99)
HDLC SERPL-MCNC: 48 MG/DL
LDLC SERPL CALC-MCNC: 95 MG/DL (ref 0–99)
POTASSIUM SERPL-SCNC: 5 MMOL/L (ref 3.5–5.2)
PROT SERPL-MCNC: 8 G/DL (ref 6–8.5)
SODIUM SERPL-SCNC: 139 MMOL/L (ref 134–144)
T4 FREE SERPL-MCNC: 1.8 NG/DL (ref 0.82–1.77)
TRIGL SERPL-MCNC: 116 MG/DL (ref 0–149)
TSH SERPL DL<=0.005 MIU/L-ACNC: 0.98 UIU/ML (ref 0.45–4.5)
VLDLC SERPL CALC-MCNC: 21 MG/DL (ref 5–40)

## 2023-11-08 ENCOUNTER — TELEPHONE (OUTPATIENT)
Dept: GASTROENTEROLOGY | Facility: CLINIC | Age: 43
End: 2023-11-08
Payer: COMMERCIAL

## 2023-11-08 NOTE — TELEPHONE ENCOUNTER
LAST C/S 1/10/22 IN [facility]     PERSONAL HX OF POLYPS     FAMILY HX OF POLYPS     FAMILY HX OF COLON CA    NO ASA OR BLOOD THINNERS        LIST OF  MEDICATIONS      PRAVASTATIN  LEVOTHYROXINE  VITAMIN D 3   NASACORT  PRILOSEC  TRAMADOL  MOMETASONE FUROATE        OA QUESTIONNAIRE SCANNED IN MEDIA

## 2023-11-11 ENCOUNTER — PREP FOR SURGERY (OUTPATIENT)
Dept: OTHER | Facility: HOSPITAL | Age: 43
End: 2023-11-11
Payer: COMMERCIAL

## 2023-11-11 DIAGNOSIS — Z80.0 FH: COLON CANCER IN RELATIVE <50 YEARS OLD: Primary | ICD-10-CM

## 2023-11-11 DIAGNOSIS — K63.5 COLON POLYP: ICD-10-CM

## 2024-03-08 ENCOUNTER — TELEPHONE (OUTPATIENT)
Dept: GASTROENTEROLOGY | Facility: CLINIC | Age: 44
End: 2024-03-08

## 2024-03-08 PROBLEM — K63.5 COLON POLYP: Status: ACTIVE | Noted: 2023-11-11

## 2024-03-08 NOTE — TELEPHONE ENCOUNTER
Hub staff attempted to follow warm transfer process and was unsuccessful      Caller: Sami Nayak     Relationship to patient: Self     Best call back number: [phone] (Mobile)     Patient is needing: PT SAID HE SPOKE TO SOMEONE IN DEC, AND THEY SCHED A COLONSCOPY ON 3/19/24. HOWEVER ITS NOT IN PT'S MY CHART. IF FOR SOME REASON THIS WAS NOT SCHED PLEASE SCHED HIM NEXT AVAIL COLONOSCOPY WITH DR JURADO.     YOU CAN ALSO SEND HIM A [facility] MESSAGE ALSO.

## 2024-03-15 ENCOUNTER — TELEPHONE (OUTPATIENT)
Dept: GASTROENTEROLOGY | Facility: CLINIC | Age: 44
End: 2024-03-15
Payer: COMMERCIAL

## 2024-03-22 ENCOUNTER — ANESTHESIA EVENT (OUTPATIENT)
Dept: GASTROENTEROLOGY | Facility: HOSPITAL | Age: 44
End: 2024-03-22
Payer: COMMERCIAL

## 2024-03-22 ENCOUNTER — HOSPITAL ENCOUNTER (OUTPATIENT)
Facility: HOSPITAL | Age: 44
Setting detail: HOSPITAL OUTPATIENT SURGERY
Discharge: HOME | End: 2024-03-22
Attending: INTERNAL MEDICINE | Admitting: INTERNAL MEDICINE
Payer: COMMERCIAL

## 2024-03-22 ENCOUNTER — ANESTHESIA (OUTPATIENT)
Dept: GASTROENTEROLOGY | Facility: HOSPITAL | Age: 44
End: 2024-03-22
Payer: COMMERCIAL

## 2024-03-22 VITALS
HEIGHT: 70 IN | HEART RATE: 66 BPM | SYSTOLIC BLOOD PRESSURE: 118 MMHG | OXYGEN SATURATION: 97 % | BODY MASS INDEX: 25.62 KG/M2 | WEIGHT: 179 LBS | RESPIRATION RATE: 9 BRPM | DIASTOLIC BLOOD PRESSURE: 84 MMHG

## 2024-03-22 DIAGNOSIS — Z80.0 FH: COLON CANCER IN RELATIVE <50 YEARS OLD: ICD-10-CM

## 2024-03-22 DIAGNOSIS — K63.5 COLON POLYP: ICD-10-CM

## 2024-03-22 PROCEDURE — 25010000002 PROPOFOL 10 MG/ML EMULSION: Performed by: REGISTERED NURSE

## 2024-03-22 PROCEDURE — 88305 TISSUE EXAM BY PATHOLOGIST: CPT | Performed by: INTERNAL MEDICINE

## 2024-03-22 PROCEDURE — 45380 COLONOSCOPY AND BIOPSY: CPT | Performed by: INTERNAL MEDICINE

## 2024-03-22 PROCEDURE — 25810000003 LACTATED RINGERS PER 1000 ML: Performed by: INTERNAL MEDICINE

## 2024-03-22 RX ORDER — LIDOCAINE HYDROCHLORIDE 20 MG/ML
INJECTION, SOLUTION INFILTRATION; PERINEURAL AS NEEDED
Status: DISCONTINUED | OUTPATIENT
Start: 2024-03-22 | End: 2024-03-22 | Stop reason: SURG

## 2024-03-22 RX ORDER — SODIUM CHLORIDE, SODIUM LACTATE, POTASSIUM CHLORIDE, CALCIUM CHLORIDE 600; 310; 30; 20 MG/100ML; MG/100ML; MG/100ML; MG/100ML
1000 INJECTION, SOLUTION INTRAVENOUS CONTINUOUS
Status: DISCONTINUED | OUTPATIENT
Start: 2024-03-22 | End: 2024-03-22 | Stop reason: HOSPADM

## 2024-03-22 RX ORDER — PROPOFOL 10 MG/ML
VIAL (ML) INTRAVENOUS AS NEEDED
Status: DISCONTINUED | OUTPATIENT
Start: 2024-03-22 | End: 2024-03-22 | Stop reason: SURG

## 2024-03-22 RX ORDER — ONDANSETRON 2 MG/ML
4 INJECTION INTRAMUSCULAR; INTRAVENOUS ONCE AS NEEDED
Status: DISCONTINUED | OUTPATIENT
Start: 2024-03-22 | End: 2024-03-22 | Stop reason: HOSPADM

## 2024-03-22 RX ADMIN — PROPOFOL 100 MG: 10 INJECTION, EMULSION INTRAVENOUS at 10:12

## 2024-03-22 RX ADMIN — SODIUM CHLORIDE, POTASSIUM CHLORIDE, SODIUM LACTATE AND CALCIUM CHLORIDE 1000 ML: 600; 310; 30; 20 INJECTION, SOLUTION INTRAVENOUS at 09:58

## 2024-03-22 RX ADMIN — PROPOFOL 160 MCG/KG/MIN: 10 INJECTION, EMULSION INTRAVENOUS at 10:12

## 2024-03-22 RX ADMIN — LIDOCAINE HYDROCHLORIDE 60 MG: 20 INJECTION, SOLUTION INFILTRATION; PERINEURAL at 10:12

## 2024-03-22 NOTE — ANESTHESIA PREPROCEDURE EVALUATION
Anesthesia Evaluation     NPO Solid Status: > 8 hours             Airway   Mallampati: II  TM distance: >3 FB  Neck ROM: full  no difficulty expected  Dental - normal exam     Pulmonary - normal exam   (+) asthma,  Cardiovascular - normal exam    (+) hyperlipidemia      Neuro/Psych  (+) seizures, numbness  GI/Hepatic/Renal/Endo    (+) GERD, thyroid problem     Musculoskeletal     Abdominal  - normal exam   Substance History      OB/GYN          Other                    Anesthesia Plan    ASA 2     MAC     intravenous induction     Anesthetic plan, risks, benefits, and alternatives have been provided, discussed and informed consent has been obtained with: patient.    CODE STATUS:

## 2024-03-22 NOTE — ANESTHESIA POSTPROCEDURE EVALUATION
"Patient: Sami Nayak    Procedure Summary       Date: 03/22/24 Room / [facility]:  [facility]    Anesthesia Start: 1005 Anesthesia Stop: 1027    Procedure: COLONOSCOPY INTO CECUM AND T.I. WITH COLD BIOPSY POLYPECTOMY Diagnosis:       FH: colon cancer in relative <50 years old      Colon polyp      (FH: colon cancer in relative <50 years old [Z80.0])      (Colon polyp [K63.5])    Surgeons: Harjeet Cole MD Provider: Darryl Alcaraz MD    Anesthesia Type: MAC ASA Status: 2            Anesthesia Type: MAC    Vitals  Vitals Value Taken Time   /84 03/22/24 1042   Temp     Pulse 74 03/22/24 1046   Resp 9 03/22/24 1041   SpO2 97 % 03/22/24 1046   Vitals shown include unfiled device data.        Post Anesthesia Care and Evaluation    Patient location during evaluation: bedside  Patient participation: complete - patient participated  Level of consciousness: awake and alert  Pain management: adequate    Airway patency: patent  Anesthetic complications: No anesthetic complications    Cardiovascular status: acceptable  Respiratory status: acceptable  Hydration status: acceptable    Comments: /84 (BP Location: Left arm, Patient Position: Lying)   Pulse 66   Resp 9   Ht 177.8 cm (70\")   Wt 81.2 kg (179 lb)   SpO2 97%   BMI 25.68 kg/m²     "

## 2024-03-22 NOTE — H&P
[facility]  Pre Procedure History & Physical    Chief Complaint:   Time for my colonoscopy    Subjective     HPI:   43 y.o. male who has a personal history of colon polyps.  In addition he has a family history (dad and brother) of colon cancer.  His last colonoscopy was in January 2022.    Past Medical History:   Past Medical History:   Diagnosis Date    Allergic 1991    Penicillin -morphine-tramadol    Alopecia     Alternating constipation and diarrhea     DUE TO MEDS    Asthma 1988    BMI 25.0-25.9,adult     Cancer Meedulloblastoma-thyroid-basal cell    Cataract 2004    Unsuccessful due to radiation    Elevated ferritin     Encounter for immunization     Esophageal reflux     Family history of colon cancer     History of long-term treatment with high-risk medication     Hives     Hyperlipidemia     Hypothyroidism     Left-sided chest wall pain     Low back pain 1995    Due to radiation of spine    Neuromuscular disorder 2012    Due to radiation    Pain syndrome, chronic     Pituitary adenoma     Radiation retinopathy     Rosacea     Scoliosis     Seasonal allergies     Seizures 1994    Due to brain cancer treatment    Sensorineural hearing loss (SNHL), bilateral     Vision loss, left eye        Family History:  Family History   Problem Relation Age of Onset    Lymphoma Father     Cancer Father         Diffuse large B cell lymphoma    COPD Father     Diabetes Father     Heart disease Father         Due to cancer treatment    Hyperlipidemia Father     Kidney disease Father         Stones-1/3 kidney removed for benign tumors    Lymphoma Maternal Grandmother     Heart disease Maternal Grandmother         acute MI    Coronary artery disease Maternal Grandmother     Arthritis Maternal Grandmother     COPD Maternal Grandmother     Stroke Maternal Grandmother     Thyroid disease Maternal Grandmother     Other Maternal Grandmother         Addisons disease    Alcohol abuse Maternal Grandfather      Heart disease Maternal Grandfather         First heart attack age 54/ age 62 aneurysm of aorta    Hyperlipidemia Maternal Grandfather     Anxiety disorder Brother         After wife  of COVID-19    Asthma Brother     Anxiety disorder Brother         After death of sister-in-law; diagnosis of cancer of himself and his wife within two weeks    Cancer Brother         3b colon cancer    Anxiety disorder Mother         After waking up during surgery    Arthritis Mother     Hearing loss Mother     Hyperlipidemia Mother     Stroke Mother         TIA eye    Cancer Paternal Grandfather         CLL leukemia    Kidney disease Paternal Grandmother         Stones    Malig Hyperthermia Neg Hx        Social History:   reports that he has never smoked. He has never used smokeless tobacco. He reports current alcohol use of about 4.0 standard drinks of alcohol per week. He reports that he does not use drugs.    Medications:   Medications Prior to Admission   Medication Sig Dispense Refill Last Dose    Cholecalciferol (VITAMIN D3) 125 MCG (5000 UT) capsule capsule Take 1 capsule by mouth Daily.   3/21/2024    desonide (DESOWEN) 0.05 % lotion APPLY TWICE DAILY AS NEEDED TO ECZEMA   3/21/2024    fluticasone (CUTIVATE) 0.05 % cream APPLY TO AFFECTED AREA TWICE A DAY AS NEEDED FOR FLARES   3/21/2024    levothyroxine (SYNTHROID, LEVOTHROID) 150 MCG tablet Take 1 tablet by mouth Daily.   3/21/2024    mometasone (ELOCON) 0.1 % cream Apply  topically to the appropriate area as directed Daily.   3/21/2024    omeprazole (priLOSEC) 40 MG capsule Take 20 mg by mouth Daily.   3/21/2024    pravastatin (PRAVACHOL) 80 MG tablet Take 1 tablet by mouth Daily.   3/21/2024    traMADol (ULTRAM) 50 MG tablet Take 1 tablet by mouth Every 6 (Six) Hours As Needed for Moderate Pain. 30 tablet 0 Past Week    Triamcinolone Acetonide (NASACORT) 55 MCG/ACT nasal inhaler 2 sprays into the nostril(s) as directed by provider Daily.   3/21/2024  "      Allergies:  Penicillins, Latex, and Morphine    ROS:    Pertinent items are noted in HPI     Objective     Blood pressure 135/95, pulse 80, resp. rate 16, height 177.8 cm (70\"), weight 81.2 kg (179 lb), SpO2 98%.    Physical Exam   Constitutional: Pt is oriented to person, place, and time and well-developed, well-nourished, and in no distress.   HENT:   Mouth/Throat: Oropharynx is clear and moist.   Neck: Normal range of motion. Neck supple.   Cardiovascular: Normal rate, regular rhythm and normal heart sounds.    Pulmonary/Chest: Effort normal and breath sounds normal. No respiratory distress. No  wheezes.   Abdominal: Soft. Bowel sounds are normal.   Skin: Skin is warm and dry.   Psychiatric: Mood, memory, affect and judgment normal.     Assessment & Plan     Diagnosis:  43 y.o. male who has a personal history of colon polyps.  In addition he has a family history (dad and brother) of colon cancer.  His last colonoscopy was in January 2022.    Anticipated Surgical Procedure:  Colonoscopy    The risks, benefits, and alternatives of this procedure have been discussed with the patient or the responsible party- the patient understands and agrees to proceed.    Harjeet Cole M.D.   "

## 2024-03-25 ENCOUNTER — TELEPHONE (OUTPATIENT)
Dept: GASTROENTEROLOGY | Facility: CLINIC | Age: 44
End: 2024-03-25
Payer: COMMERCIAL

## 2024-03-25 LAB
LAB AP CASE REPORT: NORMAL
PATH REPORT.FINAL DX SPEC: NORMAL
PATH REPORT.GROSS SPEC: NORMAL

## 2024-03-25 NOTE — PROGRESS NOTES
03/25/24       Tell him that pathology from his recent colonoscopy showed that the colon polyp that was removed was not cancerous and not precancerous, which is good.  I recommend that he have a repeat colonoscopy in 5 years.  Please send a copy of this report to his PCP.  Efra prakash

## 2024-03-25 NOTE — TELEPHONE ENCOUNTER
----- Message from Harjeet Cole MD sent at 3/25/2024  1:35 PM EDT -----  03/25/24       Tell him that pathology from his recent colonoscopy showed that the colon polyp that was removed was not cancerous and not precancerous, which is good.  I recommend that he have a repeat colonoscopy in 5 years.  Please send a copy of this report to his PCP.  Deonx. kjh

## 2024-03-25 NOTE — TELEPHONE ENCOUNTER
Patient notified of results and recommendations and verbalized understanding    Hm and cs recall placed for 3/22/29    Result note routed to PCP via epic

## 2024-03-26 ENCOUNTER — OFFICE VISIT (OUTPATIENT)
Dept: FAMILY MEDICINE CLINIC | Facility: CLINIC | Age: 44
End: 2024-03-26
Payer: COMMERCIAL

## 2024-03-26 VITALS
DIASTOLIC BLOOD PRESSURE: 78 MMHG | BODY MASS INDEX: 25.62 KG/M2 | HEART RATE: 78 BPM | SYSTOLIC BLOOD PRESSURE: 116 MMHG | HEIGHT: 70 IN | OXYGEN SATURATION: 99 % | WEIGHT: 179 LBS | TEMPERATURE: 98.4 F

## 2024-03-26 DIAGNOSIS — E03.8 HYPOTHYROIDISM DUE TO HASHIMOTO'S THYROIDITIS: ICD-10-CM

## 2024-03-26 DIAGNOSIS — G89.4 PAIN SYNDROME, CHRONIC: ICD-10-CM

## 2024-03-26 DIAGNOSIS — E78.5 HYPERLIPIDEMIA, UNSPECIFIED HYPERLIPIDEMIA TYPE: ICD-10-CM

## 2024-03-26 DIAGNOSIS — M54.10 RADICULOPATHY AFFECTING UPPER EXTREMITY: ICD-10-CM

## 2024-03-26 DIAGNOSIS — E06.3 HYPOTHYROIDISM DUE TO HASHIMOTO'S THYROIDITIS: ICD-10-CM

## 2024-03-26 DIAGNOSIS — Z51.81 ENCOUNTER FOR MEDICATION MONITORING: ICD-10-CM

## 2024-03-26 DIAGNOSIS — Z00.00 ANNUAL PHYSICAL EXAM: Primary | ICD-10-CM

## 2024-03-26 PROCEDURE — 99396 PREV VISIT EST AGE 40-64: CPT | Performed by: INTERNAL MEDICINE

## 2024-03-26 RX ORDER — TRAMADOL HYDROCHLORIDE 50 MG/1
50 TABLET ORAL EVERY 6 HOURS PRN
Qty: 30 TABLET | Refills: 1 | Status: SHIPPED | OUTPATIENT
Start: 2024-03-26

## 2024-03-26 NOTE — PROGRESS NOTES
Chief Complaint   Patient presents with    Annual Exam       HPI:  Sami Nayak, -1980, is a 43 y.o. male who presents for an annual physical.    43-year-old male with history of hypothyroidism, hyperlipidemia, scoliosis, multinodular thyroid, vitamin D deficiency, male hypogonadism, radiation retinopathy, elevated ferritin.      Follow-up for thyroid.  Denies fatigue, weakness, constipation/diarrhea, hair/skin changes, weight gain/loss, depression/anxiety, rashes, palpitations, swelling, chest pain, shortness of breath or other issues.  Has been compliant with taking the medication of levothyroxine 150 mcg with no recent changes.  Denies any difficulty with the current medication.  Is being followed by endocrinology  Last thyroid test on 2022 with a TSH of 3.8     Follow-up for cholesterol.  Currently, has been feeling well without any myalgias, muscle aches, weakness, numbness, chest pain, short of breath or other issues.  Currently, is adherent with medication regimen of pravastatin 80 mg and denies medication side effects. Is due for lab follow-up.      Recent Hospitalizations:  No hospitalization(s) within the last year..    Current Medical Providers:  Patient Care Team:  Jose Armando Ramirez MD as PCP - General (Internal Medicine)  Poonam Bryant (Endocrinology)  Rickey Mendoza MD as Consulting Physician (Otolaryngology)  Candy White MD as Consulting Physician (Dermatology)  Harjeet Cole MD as Consulting Physician (Gastroenterology)  Shaun Castillo MD as Consulting Physician (Ophthalmology)    Compared to one year ago, the patient feels his physical health is the same and his mental health is the same.    Depression Screen:      2022     1:21 PM   PHQ-2/PHQ-9 Depression Screening   Little Interest or Pleasure in Doing Things 0-->not at all   Feeling Down, Depressed or Hopeless 0-->not at all   PHQ-9: Brief Depression Severity Measure Score 0       Past Medical/Family/Social  History:  The following portions of the patient's history were reviewed and updated as appropriate: allergies, current medications, past family history, past medical history, past social history, past surgical history, and problem list.    Allergies   Allergen Reactions    Penicillins Anaphylaxis    Latex Hives    Morphine Hives     Current Outpatient Medications:     Cholecalciferol (VITAMIN D3) 125 MCG (5000 UT) capsule capsule, Take 1 capsule by mouth Daily., Disp: , Rfl:     desonide (DESOWEN) 0.05 % lotion, APPLY TWICE DAILY AS NEEDED TO ECZEMA, Disp: , Rfl:     fluticasone (CUTIVATE) 0.05 % cream, APPLY TO AFFECTED AREA TWICE A DAY AS NEEDED FOR FLARES, Disp: , Rfl:     levothyroxine (SYNTHROID, LEVOTHROID) 150 MCG tablet, Take 1 tablet by mouth Daily., Disp: , Rfl:     mometasone (ELOCON) 0.1 % cream, Apply  topically to the appropriate area as directed Daily., Disp: , Rfl:     omeprazole (priLOSEC) 40 MG capsule, Take 20 mg by mouth Daily., Disp: , Rfl:     pravastatin (PRAVACHOL) 80 MG tablet, Take 1 tablet by mouth Daily., Disp: , Rfl:     traMADol (ULTRAM) 50 MG tablet, Take 1 tablet by mouth Every 6 (Six) Hours As Needed for Moderate Pain., Disp: 30 tablet, Rfl: 1    Triamcinolone Acetonide (NASACORT) 55 MCG/ACT nasal inhaler, 2 sprays into the nostril(s) as directed by provider Daily., Disp: , Rfl:     Current medication list contains high risk medications. No harmful drug interactions have been identified.  Plan of action patient utilizes tramadol and recommend that he use only as needed and as least amount as possible.    Family History   Problem Relation Age of Onset    Lymphoma Father     Cancer Father         Diffuse large B cell lymphoma    COPD Father     Diabetes Father     Heart disease Father         Due to cancer treatment    Hyperlipidemia Father     Kidney disease Father         Stones-1/3 kidney removed for benign tumors    Lymphoma Maternal Grandmother     Heart disease Maternal  Grandmother         acute MI    Coronary artery disease Maternal Grandmother     Arthritis Maternal Grandmother     COPD Maternal Grandmother     Stroke Maternal Grandmother     Thyroid disease Maternal Grandmother     Other Maternal Grandmother         Addisons disease    Alcohol abuse Maternal Grandfather     Heart disease Maternal Grandfather         First heart attack age 54/ age 62 aneurysm of aorta    Hyperlipidemia Maternal Grandfather     Anxiety disorder Brother         After wife  of COVID-19    Asthma Brother     Anxiety disorder Brother         After death of sister-in-law; diagnosis of cancer of himself and his wife within two weeks    Cancer Brother         3b colon cancer    Anxiety disorder Mother         After waking up during surgery    Arthritis Mother     Hearing loss Mother     Hyperlipidemia Mother     Stroke Mother         TIA eye    Cancer Paternal Grandfather         CLL leukemia    Kidney disease Paternal Grandmother         Stones    Malig Hyperthermia Neg Hx        Social History     Tobacco Use    Smoking status: Never     Passive exposure: Never    Smokeless tobacco: Never   Substance Use Topics    Alcohol use: Yes     Alcohol/week: 4.0 standard drinks of alcohol     Types: 4 Cans of beer per week     Comment: socially       Past Surgical History:   Procedure Laterality Date    BRAIN SURGERY      Medulloblastoma    CATARACT EXTRACTION      COLONOSCOPY N/A 01/10/2022    Procedure: COLONOSCOPY TO CECUM AND TERMINAL ILEUM WITH COLD POLYPECTOMIES;  Surgeon: Harjeet Cole MD;  Location: [facility] ENDOSCOPY;  Service: Gastroenterology;  Laterality: N/A;  FAMILY HISTORY OF COLON CANCER  COLON POLYPS, INTERNAL HEMORRHOIDS    COLONOSCOPY  2024    COLONOSCOPY N/A 3/22/2024    Procedure: COLONOSCOPY INTO CECUM AND T.I. WITH COLD BIOPSY POLYPECTOMY;  Surgeon: Harjeet Cole MD;  Location: [facility] ENDOSCOPY;  Service: Gastroenterology;  Laterality: N/A;  PRE- HISTORY OF POLYPS,  FAMILY HISTORY OF COLON CANCER  POST- POLYP, INTERNAL HEMORRHOIDS    EYE SURGERY  1994    results Strabismus    MOHS SURGERY Right 02/23/2021    ear    SINUS SURGERY  1990    TOTAL THYROIDECTOMY  10/02/2013    benign folicular neoplasms       Patient Active Problem List   Diagnosis    Scoliosis    Radiation retinopathy    Hypothyroidism    Hyperlipidemia    Esophageal reflux    Elevated ferritin    Alopecia    Multinodular thyroid    Pain syndrome, chronic    Radiculopathy affecting upper extremity    Elevated TSH    Myalgia    Chronic fatigue    Vitamin D deficiency    Male hypogonadism    Annual physical exam    Presence of intraocular lens    Unspecified astigmatism, bilateral    Bilateral myopia    Bilateral sensorineural hearing loss    Rosacea    FH: colon cancer in relative <50 years old    Injury of optic tract and pathways, unspecified side, initial encounter    Cystoid macular degeneration    Posterior subcapsular polar age-related cataract of right eye    Colon polyp     Review of Systems   Constitutional:  Negative for activity change, appetite change, fatigue, fever, unexpected weight gain and unexpected weight loss.   HENT:  Negative for nosebleeds, rhinorrhea, trouble swallowing and voice change.    Eyes:  Negative for visual disturbance.   Respiratory:  Negative for cough, chest tightness, shortness of breath and wheezing.    Cardiovascular:  Negative for chest pain, palpitations and leg swelling.   Gastrointestinal:  Negative for abdominal pain, blood in stool, constipation, diarrhea, nausea, vomiting, GERD and indigestion.   Genitourinary:  Negative for dysuria, frequency and hematuria.   Musculoskeletal:  Negative for arthralgias, back pain and myalgias.   Skin:  Negative for rash and wound.   Neurological:  Negative for dizziness, tremors, weakness, light-headedness, numbness, headache and memory problem.   Hematological:  Negative for adenopathy. Does not bruise/bleed easily.  "  Psychiatric/Behavioral:  Negative for sleep disturbance and depressed mood. The patient is not nervous/anxious.      Objective     Vitals:    03/26/24 1324   BP: 116/78   Pulse: 78   Temp: 98.4 °F (36.9 °C)   TempSrc: Infrared   SpO2: 99%   Weight: 81.2 kg (179 lb)   Height: 177.8 cm (70\")   PainSc: 0-No pain       BMI is >= 25 and <30. (Overweight) The following options were offered after discussion;: weight loss educational material (shared in after visit summary), exercise counseling/recommendations, and nutrition counseling/recommendations    Physical Exam  Vitals and nursing note reviewed.   Constitutional:       General: He is not in acute distress.     Appearance: He is well-developed. He is not diaphoretic.   HENT:      Head: Normocephalic and atraumatic.      Right Ear: External ear normal.      Left Ear: External ear normal.      Nose: Nose normal.   Eyes:      Conjunctiva/sclera: Conjunctivae normal.      Pupils: Pupils are equal, round, and reactive to light.   Neck:      Thyroid: No thyromegaly.      Trachea: No tracheal deviation.   Cardiovascular:      Rate and Rhythm: Normal rate and regular rhythm.      Heart sounds: Normal heart sounds. No murmur heard.     No friction rub. No gallop.   Pulmonary:      Effort: Pulmonary effort is normal. No respiratory distress.      Breath sounds: Normal breath sounds.   Abdominal:      General: Bowel sounds are normal.      Palpations: Abdomen is soft. There is no mass.      Tenderness: There is no abdominal tenderness. There is no guarding.   Musculoskeletal:         General: Normal range of motion.      Cervical back: Normal range of motion and neck supple.   Lymphadenopathy:      Cervical: No cervical adenopathy.   Skin:     General: Skin is warm and dry.      Capillary Refill: Capillary refill takes less than 2 seconds.      Findings: No rash.   Neurological:      Mental Status: He is alert and oriented to person, place, and time.      Motor: No abnormal " muscle tone.      Deep Tendon Reflexes: Reflexes normal.   Psychiatric:         Behavior: Behavior normal.         Thought Content: Thought content normal.         Judgment: Judgment normal.     Recent Lab Results:     Lab Results   Component Value Date    TRIG 116 09/19/2023    HDL 48 09/19/2023    VLDL 21 09/19/2023     Assessment & Plan   Age-appropriate Screening Schedule:  Refer to the list below for future screening recommendations based on patient's age, sex and/or medical conditions.      Health Maintenance   Topic Date Due    COVID-19 Vaccine (5 - 2023-24 season) 09/01/2023    ANNUAL PHYSICAL  09/21/2023    LIPID PANEL  09/19/2024    BMI FOLLOWUP  03/26/2025    COLORECTAL CANCER SCREENING  03/22/2029    TDAP/TD VACCINES (3 - Td or Tdap) 08/31/2032    INFLUENZA VACCINE  Completed    Pneumococcal Vaccine 0-64  Aged Out    HEPATITIS C SCREENING  Discontinued       Diagnoses and all orders for this visit:    1. Annual physical exam (Primary)    2. Hypothyroidism due to Hashimoto's thyroiditis    3. Hyperlipidemia, unspecified hyperlipidemia type    4. Pain syndrome, chronic  -     traMADol (ULTRAM) 50 MG tablet; Take 1 tablet by mouth Every 6 (Six) Hours As Needed for Moderate Pain.  Dispense: 30 tablet; Refill: 1  -     Compliance Drug Analysis, Ur - Urine, Clean Catch    5. Radiculopathy affecting upper extremity  -     traMADol (ULTRAM) 50 MG tablet; Take 1 tablet by mouth Every 6 (Six) Hours As Needed for Moderate Pain.  Dispense: 30 tablet; Refill: 1  -     Compliance Drug Analysis, Ur - Urine, Clean Catch    6. Encounter for medication monitoring  -     Compliance Drug Analysis, Ur - Urine, Clean Catch    Annual wellness visit reviewed with patient.  All past history, medications, social history, and problem list were reviewed.  Discussed advanced directives and living will.  Patient has living will: Living will: Directive already scanned in chart.  Will check the labs as ordered above to evaluate the  blood sugars, kidney, liver, cholesterol for screening.  Discussed flu shot recommended to get the influenza vaccine annually in the fall. Covid booster discussed and recommended.  Encouraged follow-up with the eye doctor on annual basis.  Discussed weight and encouraged exercise as tolerated while following a healthy diet.  Reviewed sexual health and safe sex practices.  Colon cancer screening discussed and current status: colonoscopy completed on 3/22/2024 with polyp but repeat after 5 years recommended.  Follow up with current specialists as needed.   WIN run and reviewed.  Risks of the medication include but are not limited to fatigue, somnolence, increased risk of falls, constipation, allergic reaction, dependence, and addiction.  Controlled substance compliance urine screening ordered.  Continue current medications unchanged.    An After Visit Summary with all of these plans were given to the patient.        Follow Up:  No follow-ups on file.

## 2024-04-04 LAB — DRUGS UR: NORMAL

## 2024-09-11 ENCOUNTER — OFFICE VISIT (OUTPATIENT)
Dept: FAMILY MEDICINE CLINIC | Facility: CLINIC | Age: 44
End: 2024-09-11
Payer: COMMERCIAL

## 2024-09-11 VITALS
HEART RATE: 73 BPM | HEIGHT: 70 IN | SYSTOLIC BLOOD PRESSURE: 118 MMHG | TEMPERATURE: 97.6 F | OXYGEN SATURATION: 98 % | WEIGHT: 183 LBS | DIASTOLIC BLOOD PRESSURE: 80 MMHG | BODY MASS INDEX: 26.2 KG/M2

## 2024-09-11 DIAGNOSIS — R79.89 ELEVATED FERRITIN: ICD-10-CM

## 2024-09-11 DIAGNOSIS — M54.10 RADICULOPATHY AFFECTING UPPER EXTREMITY: ICD-10-CM

## 2024-09-11 DIAGNOSIS — E78.5 HYPERLIPIDEMIA, UNSPECIFIED HYPERLIPIDEMIA TYPE: Primary | ICD-10-CM

## 2024-09-11 DIAGNOSIS — E06.3 HYPOTHYROIDISM DUE TO HASHIMOTO'S THYROIDITIS: ICD-10-CM

## 2024-09-11 DIAGNOSIS — E03.8 HYPOTHYROIDISM DUE TO HASHIMOTO'S THYROIDITIS: ICD-10-CM

## 2024-09-11 DIAGNOSIS — G89.4 PAIN SYNDROME, CHRONIC: ICD-10-CM

## 2024-09-11 PROCEDURE — 99214 OFFICE O/P EST MOD 30 MIN: CPT | Performed by: INTERNAL MEDICINE

## 2024-09-11 RX ORDER — TRAMADOL HYDROCHLORIDE 50 MG/1
50 TABLET ORAL EVERY 6 HOURS PRN
Qty: 30 TABLET | Refills: 1 | Status: SHIPPED | OUTPATIENT
Start: 2024-09-11

## 2024-09-11 NOTE — PROGRESS NOTES
Francis Nayak is a 44 y.o. male.     Chief Complaint   Patient presents with    Hyperlipidemia       History of Present Illness   43-year-old male with history of hypothyroidism, hyperlipidemia, scoliosis, multinodular thyroid, vitamin D deficiency, male hypogonadism, radiation retinopathy, elevated ferritin.      Follow-up for thyroid.  Denies fatigue, weakness, constipation/diarrhea, hair/skin changes, weight gain/loss, depression/anxiety, rashes, palpitations, swelling, chest pain, shortness of breath or other issues.  Has been compliant with taking the medication of levothyroxine 150 mcg with no recent changes.  Denies any difficulty with the current medication.  Is being followed by endocrinology  Last thyroid test on 12/5/2023.      Follow-up for cholesterol.  Currently, has been feeling well without any myalgias, muscle aches, weakness, numbness, chest pain, short of breath or other issues.  Currently, is adherent with medication regimen of pravastatin 80 mg and denies medication side effects. Is due for lab follow-up.     The following portions of the patient's history were reviewed and updated as appropriate: allergies, current medications, past family history, past medical history, past social history, past surgical history and problem list.    Depression Screen:      9/11/2024     1:31 PM   PHQ-2/PHQ-9 Depression Screening   Little Interest or Pleasure in Doing Things 0-->not at all   Feeling Down, Depressed or Hopeless 0-->not at all   PHQ-9: Brief Depression Severity Measure Score 0       Past Medical History:   Diagnosis Date    Allergic 1991    Penicillin -morphine-tramadol    Alopecia     Alternating constipation and diarrhea     DUE TO MEDS    Asthma 1988    BMI 25.0-25.9,adult     Cancer Meedulloblastoma-thyroid-basal cell    Cataract 2004    Unsuccessful due to radiation    Elevated ferritin     Encounter for immunization     Esophageal reflux     Family history of colon cancer      History of long-term treatment with high-risk medication     Hives     Hyperlipidemia     Hypothyroidism     Left-sided chest wall pain     Low back pain 1995    Due to radiation of spine    Neuromuscular disorder 2012    Due to radiation    Pain syndrome, chronic     Pituitary adenoma     Radiation retinopathy     Rosacea     Scoliosis     Seasonal allergies     Seizures 1994    Due to brain cancer treatment    Sensorineural hearing loss (SNHL), bilateral     Vision loss, left eye        Past Surgical History:   Procedure Laterality Date    BRAIN SURGERY  1994    Medulloblastoma    CATARACT EXTRACTION  2004    COLONOSCOPY N/A 01/10/2022    Procedure: COLONOSCOPY TO CECUM AND TERMINAL ILEUM WITH COLD POLYPECTOMIES;  Surgeon: Harjeet Cole MD;  Location: [facility] ENDOSCOPY;  Service: Gastroenterology;  Laterality: N/A;  FAMILY HISTORY OF COLON CANCER  COLON POLYPS, INTERNAL HEMORRHOIDS    COLONOSCOPY  03/22/2024    COLONOSCOPY N/A 3/22/2024    Procedure: COLONOSCOPY INTO CECUM AND T.I. WITH COLD BIOPSY POLYPECTOMY;  Surgeon: Harjeet Cole MD;  Location: [facility] ENDOSCOPY;  Service: Gastroenterology;  Laterality: N/A;  PRE- HISTORY OF POLYPS, FAMILY HISTORY OF COLON CANCER  POST- POLYP, INTERNAL HEMORRHOIDS    EYE SURGERY  1994    results Strabismus    MOHS SURGERY Right 02/23/2021    ear    SINUS SURGERY  1990    TOTAL THYROIDECTOMY  10/02/2013    benign folicular neoplasms       Family History   Problem Relation Age of Onset    Lymphoma Father     Cancer Father         Diffuse large B cell lymphoma    COPD Father     Diabetes Father     Heart disease Father         Due to cancer treatment    Hyperlipidemia Father     Kidney disease Father         Stones-1/3 kidney removed for benign tumors    Lymphoma Maternal Grandmother     Heart disease Maternal Grandmother         acute MI    Coronary artery disease Maternal Grandmother     Arthritis Maternal Grandmother     COPD Maternal Grandmother     Stroke Maternal Grandmother      Thyroid disease Maternal Grandmother     Other Maternal Grandmother         Addisons disease    Alcohol abuse Maternal Grandfather     Heart disease Maternal Grandfather         First heart attack age 54/ age 62 aneurysm of aorta    Hyperlipidemia Maternal Grandfather     Anxiety disorder Brother         After wife  of COVID-19    Asthma Brother     Anxiety disorder Brother         After death of sister-in-law; diagnosis of cancer of himself and his wife within two weeks    Cancer Brother         3b colon cancer    Anxiety disorder Mother         After waking up during surgery    Arthritis Mother     Hearing loss Mother     Hyperlipidemia Mother     Stroke Mother         TIA eye    Cancer Paternal Grandfather         CLL leukemia    Kidney disease Paternal Grandmother         Stones    Malig Hyperthermia Neg Hx        Social History     Socioeconomic History    Marital status: Single   Tobacco Use    Smoking status: Never     Passive exposure: Never    Smokeless tobacco: Never   Vaping Use    Vaping status: Never Used   Substance and Sexual Activity    Alcohol use: Yes     Alcohol/week: 4.0 standard drinks of alcohol     Types: 4 Cans of beer per week     Comment: socially    Drug use: No    Sexual activity: Not Currently     Partners: Female     Birth control/protection: Condom       Current Outpatient Medications   Medication Sig Dispense Refill    Cholecalciferol (VITAMIN D3) 125 MCG (5000 UT) capsule capsule Take 1 capsule by mouth Daily.      desonide (DESOWEN) 0.05 % lotion APPLY TWICE DAILY AS NEEDED TO ECZEMA      fluticasone (CUTIVATE) 0.05 % cream APPLY TO AFFECTED AREA TWICE A DAY AS NEEDED FOR FLARES      levothyroxine (SYNTHROID, LEVOTHROID) 150 MCG tablet Take 1 tablet by mouth Daily.      mometasone (ELOCON) 0.1 % cream Apply  topically to the appropriate area as directed Daily.      omeprazole (priLOSEC) 40 MG capsule Take 20 mg by mouth Daily.      pravastatin (PRAVACHOL) 80 MG tablet  "Take 1 tablet by mouth Daily.      traMADol (ULTRAM) 50 MG tablet Take 1 tablet by mouth Every 6 (Six) Hours As Needed for Moderate Pain. 30 tablet 1    Triamcinolone Acetonide (NASACORT) 55 MCG/ACT nasal inhaler 2 sprays into the nostril(s) as directed by provider Daily.       No current facility-administered medications for this visit.       Review of Systems   Constitutional:  Negative for activity change, appetite change, fatigue, fever, unexpected weight gain and unexpected weight loss.   HENT:  Negative for nosebleeds, rhinorrhea, trouble swallowing and voice change.    Eyes:  Negative for visual disturbance.   Respiratory:  Negative for cough, chest tightness, shortness of breath and wheezing.    Cardiovascular:  Negative for chest pain, palpitations and leg swelling.   Gastrointestinal:  Negative for abdominal pain, blood in stool, constipation, diarrhea, nausea, vomiting, GERD and indigestion.   Genitourinary:  Negative for dysuria, frequency and hematuria.   Musculoskeletal:  Negative for arthralgias, back pain and myalgias.   Skin:  Negative for rash and wound.   Neurological:  Negative for dizziness, tremors, weakness, light-headedness, numbness, headache and memory problem.   Hematological:  Negative for adenopathy. Does not bruise/bleed easily.   Psychiatric/Behavioral:  Negative for sleep disturbance and depressed mood. The patient is not nervous/anxious.        Objective   /80 (BP Location: Right arm, Patient Position: Sitting, Cuff Size: Large Adult)   Pulse 73   Temp 97.6 °F (36.4 °C) (Temporal)   Ht 177.8 cm (70\")   Wt 83 kg (183 lb)   SpO2 98%   BMI 26.26 kg/m²     Physical Exam  Vitals and nursing note reviewed.   Constitutional:       General: He is not in acute distress.     Appearance: He is well-developed. He is not diaphoretic.   HENT:      Head: Normocephalic and atraumatic.      Right Ear: External ear normal.      Left Ear: External ear normal.      Nose: Nose normal.   Eyes: "      Conjunctiva/sclera: Conjunctivae normal.      Pupils: Pupils are equal, round, and reactive to light.   Neck:      Thyroid: No thyromegaly.      Trachea: No tracheal deviation.   Cardiovascular:      Rate and Rhythm: Normal rate and regular rhythm.      Heart sounds: Normal heart sounds. No murmur heard.     No friction rub. No gallop.   Pulmonary:      Effort: Pulmonary effort is normal. No respiratory distress.      Breath sounds: Normal breath sounds.   Abdominal:      General: Bowel sounds are normal.      Palpations: Abdomen is soft. There is no mass.      Tenderness: There is no abdominal tenderness. There is no guarding.   Musculoskeletal:         General: Normal range of motion.      Cervical back: Normal range of motion and neck supple.   Lymphadenopathy:      Cervical: No cervical adenopathy.   Skin:     General: Skin is warm and dry.      Capillary Refill: Capillary refill takes less than 2 seconds.      Findings: No rash.   Neurological:      Mental Status: He is alert and oriented to person, place, and time.      Motor: No abnormal muscle tone.      Deep Tendon Reflexes: Reflexes normal.   Psychiatric:         Behavior: Behavior normal.         Thought Content: Thought content normal.         Judgment: Judgment normal.         No results found for this or any previous visit (from the past 2016 hour(s)).  Assessment & Plan   Diagnoses and all orders for this visit:    1. Hyperlipidemia, unspecified hyperlipidemia type (Primary)  -     Comprehensive Metabolic Panel  -     Lipid Panel    2. Hypothyroidism due to Hashimoto's thyroiditis  -     TSH Rfx On Abnormal To Free T4    3. Elevated ferritin  -     Ferritin    4. Pain syndrome, chronic  -     traMADol (ULTRAM) 50 MG tablet; Take 1 tablet by mouth Every 6 (Six) Hours As Needed for Moderate Pain.  Dispense: 30 tablet; Refill: 1    5. Radiculopathy affecting upper extremity  -     traMADol (ULTRAM) 50 MG tablet; Take 1 tablet by mouth Every 6 (Six)  Hours As Needed for Moderate Pain.  Dispense: 30 tablet; Refill: 1               Dictated utilizing Dragon Dictation

## 2024-09-12 LAB
ALBUMIN SERPL-MCNC: 4.3 G/DL (ref 3.5–5.2)
ALBUMIN/GLOB SERPL: 1.3 G/DL
ALP SERPL-CCNC: 85 U/L (ref 39–117)
ALT SERPL-CCNC: 32 U/L (ref 1–41)
AST SERPL-CCNC: 29 U/L (ref 1–40)
BILIRUB SERPL-MCNC: 0.4 MG/DL (ref 0–1.2)
BUN SERPL-MCNC: 10 MG/DL (ref 6–20)
BUN/CREAT SERPL: 10.8 (ref 7–25)
CALCIUM SERPL-MCNC: 9.7 MG/DL (ref 8.6–10.5)
CHLORIDE SERPL-SCNC: 100 MMOL/L (ref 98–107)
CHOLEST SERPL-MCNC: 178 MG/DL (ref 0–200)
CO2 SERPL-SCNC: 28.4 MMOL/L (ref 22–29)
CREAT SERPL-MCNC: 0.93 MG/DL (ref 0.76–1.27)
EGFRCR SERPLBLD CKD-EPI 2021: 102.6 ML/MIN/1.73
FERRITIN SERPL-MCNC: 490 NG/ML (ref 30–400)
GLOBULIN SER CALC-MCNC: 3.2 GM/DL
GLUCOSE SERPL-MCNC: 85 MG/DL (ref 65–99)
HDLC SERPL-MCNC: 48 MG/DL (ref 40–60)
LDLC SERPL CALC-MCNC: 109 MG/DL (ref 0–100)
POTASSIUM SERPL-SCNC: 4.5 MMOL/L (ref 3.5–5.2)
PROT SERPL-MCNC: 7.5 G/DL (ref 6–8.5)
SODIUM SERPL-SCNC: 141 MMOL/L (ref 136–145)
T4 FREE SERPL-MCNC: 1.78 NG/DL (ref 0.93–1.7)
TRIGL SERPL-MCNC: 115 MG/DL (ref 0–150)
TSH SERPL DL<=0.005 MIU/L-ACNC: 0.37 UIU/ML (ref 0.27–4.2)
VLDLC SERPL CALC-MCNC: 21 MG/DL (ref 5–40)

## 2025-03-12 ENCOUNTER — OFFICE VISIT (OUTPATIENT)
Dept: FAMILY MEDICINE CLINIC | Facility: CLINIC | Age: 45
End: 2025-03-12
Payer: COMMERCIAL

## 2025-03-12 VITALS
DIASTOLIC BLOOD PRESSURE: 70 MMHG | TEMPERATURE: 97.8 F | OXYGEN SATURATION: 100 % | SYSTOLIC BLOOD PRESSURE: 102 MMHG | WEIGHT: 184.4 LBS | HEART RATE: 98 BPM | HEIGHT: 70 IN | BODY MASS INDEX: 26.4 KG/M2

## 2025-03-12 DIAGNOSIS — G89.4 PAIN SYNDROME, CHRONIC: ICD-10-CM

## 2025-03-12 DIAGNOSIS — M54.10 RADICULOPATHY AFFECTING UPPER EXTREMITY: ICD-10-CM

## 2025-03-12 DIAGNOSIS — E06.3 HYPOTHYROIDISM DUE TO HASHIMOTO THYROIDITIS: ICD-10-CM

## 2025-03-12 DIAGNOSIS — J06.9 UPPER RESPIRATORY TRACT INFECTION, UNSPECIFIED TYPE: ICD-10-CM

## 2025-03-12 DIAGNOSIS — E78.00 PURE HYPERCHOLESTEROLEMIA: Primary | ICD-10-CM

## 2025-03-12 PROBLEM — R07.89 CHEST WALL PAIN, CHRONIC: Status: ACTIVE | Noted: 2025-03-12

## 2025-03-12 PROBLEM — G89.29 CHEST WALL PAIN, CHRONIC: Status: ACTIVE | Noted: 2025-03-12

## 2025-03-12 PROCEDURE — 99214 OFFICE O/P EST MOD 30 MIN: CPT | Performed by: INTERNAL MEDICINE

## 2025-03-12 RX ORDER — TRAMADOL HYDROCHLORIDE 50 MG/1
50 TABLET ORAL EVERY 6 HOURS PRN
Qty: 30 TABLET | Refills: 1 | Status: SHIPPED | OUTPATIENT
Start: 2025-03-12

## 2025-03-12 RX ORDER — LEVOTHYROXINE SODIUM 137 UG/1
137 TABLET ORAL
COMMUNITY

## 2025-03-12 NOTE — PROGRESS NOTES
Francis Nayak is a 44 y.o. male.     Chief Complaint   Patient presents with    Hyperlipidemia     F/u    Hypothyroidism    Headache    Cough     X4 days   Mother was seen and had upper respiratory infection over the weekend (Sal his father was seen yesterday)       History of Present Illness   43-year-old male with history of hypothyroidism, hyperlipidemia, scoliosis, multinodular thyroid, vitamin D deficiency, male hypogonadism, radiation retinopathy, elevated ferritin.      Follow-up for thyroid.  Denies fatigue, weakness, constipation/diarrhea, hair/skin changes, weight gain/loss, depression/anxiety, rashes, palpitations, swelling, chest pain, shortness of breath or other issues.  Has been compliant with taking the medication of levothyroxine 150 mcg with no recent changes.  Denies any difficulty with the current medication.  Is being followed by endocrinology Dr Bryant.  Last thyroid test on 2/10/2025 with a normal TSH.       Follow-up for cholesterol.  Currently, has been feeling well without any myalgias, muscle aches, weakness, numbness, chest pain, short of breath or other issues.  Currently, is adherent with medication regimen of pravastatin 80 mg and denies medication side effects.  Last lipid panel on 9/11/2024 with an LDL of 1 and a normal CMP.     Has had 4 days of a cough with headache.  Mother with similar with a URI approximately 5 days ago was seen in urgent care and given oral steroids cough medicine.    The following portions of the patient's history were reviewed and updated as appropriate: allergies, current medications, past family history, past medical history, past social history, past surgical history and problem list.    Depression Screen:      3/12/2025     1:05 PM   PHQ-2/PHQ-9 Depression Screening   Little interest or pleasure in doing things Not at all   Feeling down, depressed, or hopeless Not at all       Past Medical History:   Diagnosis Date    Allergic 1991     Penicillin -morphine-tramadol    Alopecia     Alternating constipation and diarrhea     DUE TO MEDS    Asthma 1988    BMI 25.0-25.9,adult     Cancer Meedulloblastoma-thyroid-basal cell    Cataract 2004    Unsuccessful due to radiation    Elevated ferritin     Encounter for immunization     Esophageal reflux     Family history of colon cancer     History of long-term treatment with high-risk medication     Hives     Hyperlipidemia     Hypothyroidism     Left-sided chest wall pain     Low back pain 1995    Due to radiation of spine    Neuromuscular disorder 2012    Due to radiation    Pain syndrome, chronic     Pituitary adenoma     Radiation retinopathy     Rosacea     Scoliosis     Seasonal allergies     Seizures 1994    Due to brain cancer treatment    Sensorineural hearing loss (SNHL), bilateral     Vision loss, left eye        Past Surgical History:   Procedure Laterality Date    BRAIN SURGERY  1994    Medulloblastoma    CATARACT EXTRACTION  2004    COLONOSCOPY N/A 01/10/2022    Procedure: COLONOSCOPY TO CECUM AND TERMINAL ILEUM WITH COLD POLYPECTOMIES;  Surgeon: Harjeet Cole MD;  Location: [facility];  Service: Gastroenterology;  Laterality: N/A;  FAMILY HISTORY OF COLON CANCER  COLON POLYPS, INTERNAL HEMORRHOIDS    COLONOSCOPY  03/22/2024    COLONOSCOPY N/A 3/22/2024    Procedure: COLONOSCOPY INTO CECUM AND T.I. WITH COLD BIOPSY POLYPECTOMY;  Surgeon: Harjeet Cole MD;  Location: [facility];  Service: Gastroenterology;  Laterality: N/A;  PRE- HISTORY OF POLYPS, FAMILY HISTORY OF COLON CANCER  POST- POLYP, INTERNAL HEMORRHOIDS    EYE SURGERY  1994    results Strabismus    MOHS SURGERY Right 02/23/2021    ear    SINUS SURGERY  1990    TOTAL THYROIDECTOMY  10/02/2013    benign folicular neoplasms       Family History   Problem Relation Age of Onset    Lymphoma Father     Cancer Father         Diffuse large B cell lymphoma    COPD Father     Diabetes Father     Heart disease Father         Due to  cancer treatment    Hyperlipidemia Father     Kidney disease Father         Stones-1/3 kidney removed for benign tumors    Lymphoma Maternal Grandmother     Heart disease Maternal Grandmother         acute MI    Coronary artery disease Maternal Grandmother     Arthritis Maternal Grandmother     COPD Maternal Grandmother     Stroke Maternal Grandmother     Thyroid disease Maternal Grandmother     Other Maternal Grandmother         Addisons disease    Alcohol abuse Maternal Grandfather     Heart disease Maternal Grandfather         First heart attack age 56/ age 64 aneurysm of aorta    Hyperlipidemia Maternal Grandfather     Anxiety disorder Brother         After wife  of COVID-19    Asthma Brother     Anxiety disorder Brother         After death of sister-in-law; diagnosis of cancer of himself and his wife within two weeks    Cancer Brother         3b colon cancer    Anxiety disorder Mother         After waking up during surgery    Arthritis Mother     Hearing loss Mother     Hyperlipidemia Mother     Stroke Mother         TIA eye    Cancer Paternal Grandfather         CLL leukemia    Kidney disease Paternal Grandmother         Stones    Malig Hyperthermia Neg Hx        Social History     Socioeconomic History    Marital status: Single   Tobacco Use    Smoking status: Never     Passive exposure: Never    Smokeless tobacco: Never   Vaping Use    Vaping status: Never Used   Substance and Sexual Activity    Alcohol use: Yes     Alcohol/week: 4.0 standard drinks of alcohol     Types: 4 Cans of beer per week     Comment: socially    Drug use: No    Sexual activity: Not Currently     Partners: Female     Birth control/protection: Condom       Current Outpatient Medications   Medication Sig Dispense Refill    Cholecalciferol (VITAMIN D3) 125 MCG (5000 UT) capsule capsule Take 1 capsule by mouth Daily.      desonide (DESOWEN) 0.05 % lotion APPLY TWICE DAILY AS NEEDED TO ECZEMA      fluticasone (CUTIVATE) 0.05 % cream  APPLY TO AFFECTED AREA TWICE A DAY AS NEEDED FOR FLARES      levothyroxine (SYNTHROID, LEVOTHROID) 137 MCG tablet Take 1 tablet by mouth Every Morning.      mometasone (ELOCON) 0.1 % cream Apply  topically to the appropriate area as directed Daily.      omeprazole (priLOSEC) 40 MG capsule Take 20 mg by mouth Daily.      pravastatin (PRAVACHOL) 80 MG tablet Take 1 tablet by mouth Daily.      traMADol (ULTRAM) 50 MG tablet Take 1 tablet by mouth Every 6 (Six) Hours As Needed for Moderate Pain. 30 tablet 1    Triamcinolone Acetonide (NASACORT) 55 MCG/ACT nasal inhaler Administer 2 sprays into the nostril(s) as directed by provider Daily.       No current facility-administered medications for this visit.       Review of Systems   Constitutional:  Negative for activity change, appetite change, fatigue, fever, unexpected weight gain and unexpected weight loss.   HENT:  Negative for nosebleeds, rhinorrhea, trouble swallowing and voice change.    Eyes:  Negative for visual disturbance.   Respiratory:  Negative for cough, chest tightness, shortness of breath and wheezing.    Cardiovascular:  Negative for chest pain, palpitations and leg swelling.   Gastrointestinal:  Negative for abdominal pain, blood in stool, constipation, diarrhea, nausea, vomiting, GERD and indigestion.   Genitourinary:  Negative for dysuria, frequency and hematuria.   Musculoskeletal:  Negative for arthralgias, back pain and myalgias.        Intermittent left chest wall pain.   Skin:  Negative for rash and wound.   Neurological:  Negative for dizziness, tremors, weakness, light-headedness, numbness, headache and memory problem.   Hematological:  Negative for adenopathy. Does not bruise/bleed easily.   Psychiatric/Behavioral:  Negative for sleep disturbance and depressed mood. The patient is not nervous/anxious.        Objective   /70 (BP Location: Right arm, Patient Position: Sitting, Cuff Size: Large Adult)   Pulse 98   Temp 97.8 °F (36.6 °C)  "(Temporal)   Ht 177.8 cm (70\")   Wt 83.6 kg (184 lb 6.4 oz)   SpO2 100%   BMI 26.46 kg/m²     Physical Exam  Vitals and nursing note reviewed.   Constitutional:       General: He is not in acute distress.     Appearance: He is well-developed. He is not diaphoretic.   HENT:      Head: Normocephalic and atraumatic.      Right Ear: External ear normal.      Left Ear: External ear normal.      Nose: Nose normal.   Eyes:      Conjunctiva/sclera: Conjunctivae normal.      Pupils: Pupils are equal, round, and reactive to light.   Neck:      Thyroid: No thyromegaly.      Trachea: No tracheal deviation.   Cardiovascular:      Rate and Rhythm: Normal rate and regular rhythm.      Heart sounds: Normal heart sounds. No murmur heard.     No friction rub. No gallop.   Pulmonary:      Effort: Pulmonary effort is normal. No respiratory distress.      Breath sounds: Normal breath sounds.   Abdominal:      General: Bowel sounds are normal.      Palpations: Abdomen is soft. There is no mass.      Tenderness: There is no abdominal tenderness. There is no guarding.   Musculoskeletal:         General: Normal range of motion.      Cervical back: Normal range of motion and neck supple.   Lymphadenopathy:      Cervical: No cervical adenopathy.   Skin:     General: Skin is warm and dry.      Capillary Refill: Capillary refill takes less than 2 seconds.      Findings: No rash.   Neurological:      Mental Status: He is alert and oriented to person, place, and time.      Motor: No abnormal muscle tone.      Deep Tendon Reflexes: Reflexes normal.   Psychiatric:         Behavior: Behavior normal.         Thought Content: Thought content normal.         Judgment: Judgment normal.         No results found for this or any previous visit (from the past 12 weeks).  Assessment & Plan   Diagnoses and all orders for this visit:    1. Pure hypercholesterolemia (Primary)    2. Hypothyroidism due to Hashimoto thyroiditis    3. Pain syndrome, chronic  -  "    traMADol (ULTRAM) 50 MG tablet; Take 1 tablet by mouth Every 6 (Six) Hours As Needed for Moderate Pain.  Dispense: 30 tablet; Refill: 1    4. Radiculopathy affecting upper extremity  -     traMADol (ULTRAM) 50 MG tablet; Take 1 tablet by mouth Every 6 (Six) Hours As Needed for Moderate Pain.  Dispense: 30 tablet; Refill: 1    5. Upper respiratory tract infection, unspecified type    We discussed reviewed his last labs for both his thyroid and his cholesterol.  Things have been doing well and he is tolerating well by difficulties.  Will continue these medications at this time.  He does have a chronic pain syndrome with radiculopathy requiring the tramadol as needed which is usually once a week.  Will continue with the medication. WIN run and reviewed.  Risks of the medication include but are not limited to fatigue, somnolence, increased risk of falls, constipation, allergic reaction, dependence, and addiction.    URI symptoms already starting to resolve.  Symptomatic treatment only at this time and return if he has worsening problems.           Dictated utilizing Dragon Dictation

## (undated) DEVICE — SENSR O2 OXIMAX FNGR A/ 18IN NONSTR

## (undated) DEVICE — ADAPT CLN BIOGUARD AIR/H2O DISP

## (undated) DEVICE — CANN O2 ETCO2 FITS ALL CONN CO2 SMPL A/ 7IN DISP LF

## (undated) DEVICE — SINGLE-USE BIOPSY FORCEPS: Brand: RADIAL JAW 4

## (undated) DEVICE — LN SMPL CO2 SHTRM SD STREAM W/M LUER

## (undated) DEVICE — TUBING, SUCTION, 1/4" X 10', STRAIGHT: Brand: MEDLINE

## (undated) DEVICE — THE SINGLE USE ETRAP – POLYP TRAP IS USED FOR SUCTION RETRIEVAL OF ENDOSCOPICALLY REMOVED POLYPS.: Brand: ETRAP

## (undated) DEVICE — SNAR POLYP CAPTIVATOR RND STFF 2.4 240CM 10MM 1P/U

## (undated) DEVICE — KT ORCA ORCAPOD DISP STRL